# Patient Record
Sex: MALE | Race: WHITE | Employment: FULL TIME | URBAN - METROPOLITAN AREA
[De-identification: names, ages, dates, MRNs, and addresses within clinical notes are randomized per-mention and may not be internally consistent; named-entity substitution may affect disease eponyms.]

---

## 2017-09-14 ENCOUNTER — TELEPHONE (OUTPATIENT)
Dept: SLEEP MEDICINE | Age: 52
End: 2017-09-14

## 2017-09-14 NOTE — TELEPHONE ENCOUNTER
Patient called requesting to speak with Kleber Pablo to schedule his sleep study that was ordered in Dec 2016. Was not sure if referral  or if we needed a new pre study checklist? Roger contact patient with next steps.

## 2017-09-26 ENCOUNTER — TELEPHONE (OUTPATIENT)
Dept: SLEEP MEDICINE | Age: 52
End: 2017-09-26

## 2017-09-26 NOTE — TELEPHONE ENCOUNTER
Called patient to complete pre study checklist but patient was busy so he stated he would call our office back to complete checklist and schedule for sleep study.

## 2017-09-27 ENCOUNTER — TELEPHONE (OUTPATIENT)
Dept: SLEEP MEDICINE | Age: 52
End: 2017-09-27

## 2017-09-27 DIAGNOSIS — G47.33 OBSTRUCTIVE SLEEP APNEA (ADULT) (PEDIATRIC): Primary | ICD-10-CM

## 2017-09-27 NOTE — TELEPHONE ENCOUNTER
STUDY ORDER CONFIRMATION  Fran Toro 1965      Type of Study Requested: Direct Referral for Study - Please arrange a sleep study consult only if sleep study is positive. Referring Physician: Dr. Anthony Nassar    Date of Study: 11/19/17  Spoke with: Patient         Height: 5'9  Weight: 250lbs  (over 499 lb can only be done at [facility])  BMI:       Snoring      yes    Excessive Daytime Sleepiness   yes    Witnessed Apnea     yes    Hypertension      no    Cardiovascular disease (CHF-Heart Failure)  no    COPD or Emphysema?    no  On Oxygen? lpm Day/Night   no    Restless Leg Symptoms-   Do you experience an uncomfortable sensation in your legs   especially at night?    no   Kicking?     no   Twitching?     no    Do you experience insomnia?   no  Sleep talking/walking?     no  Do you ever wake with a rapid heart rate?  no  Unusual movements in sleep (violent, flailing limbs? )no  Night Terrors?      no  Sleep Paralysis or Sleep Hallucinations:  (while waking from sleep or dream, you cannot move) no  Do you/have you had seizures?   no  Have you had a Stroke, CVA or TIA?   no       When? Do you take any medications for the following? Pain       no  Anxiety       no  Sleep       no               Have you been in hospital?    no   Has it been at least 72 hrs. since discharge? no   Discharge Date   (If not at least 72 hrs, cannot see pt. for study)    Are you currently on an antibiotic?   no  If yes, for what reason? Do you need anything from us for your employer? no    Are you a CDL, DOT or other Certified ? no     Have you had a sleep study before?   no  If yes, when and where? Are you currently using CPAP?   no  If yes, what is the setting? Do you have any special needs?   Wheelchair      no  Help to and from the bathroom   no  Other?      no    (If yes to any special needs a care giver may need to come with the patient and stay the night.)    Will someone need to accompany you on the night of your study? (Primarily for parents of minors, patients with special needs, elderly, long distance travel). Other family,  may stay until study begins. \"We want to be sure to take the best care of you. Are there Cultural or Spiritual needs that we should be aware of or are there any concerns that I can address for you?        no    If yes, describe:      Attach Medication List    If yes to any \"*\" or special needs, discuss with the Lead Tech    Notes:     Study date:11/19/17  Time:8:30pm Location:[facility]      Compiled by:  Linda Calvo    Date: 9/27/17

## 2017-09-29 NOTE — TELEPHONE ENCOUNTER
STUDY ORDER CONFIRMATION    Study Indication:   G47.33  Study: Diagnostic polysomnogram - CPT 52159: Split Study if patient meets the criteria.   Study Instructions / additional orders:

## 2017-11-19 ENCOUNTER — HOSPITAL ENCOUNTER (OUTPATIENT)
Dept: SLEEP MEDICINE | Age: 52
Discharge: HOME | End: 2017-11-19
Payer: COMMERCIAL

## 2017-11-19 VITALS
WEIGHT: 260.9 LBS | TEMPERATURE: 97.5 F | HEIGHT: 69 IN | DIASTOLIC BLOOD PRESSURE: 73 MMHG | OXYGEN SATURATION: 97 % | SYSTOLIC BLOOD PRESSURE: 132 MMHG | BODY MASS INDEX: 38.64 KG/M2

## 2017-11-19 DIAGNOSIS — G47.33 OBSTRUCTIVE SLEEP APNEA: ICD-10-CM

## 2017-11-19 PROCEDURE — 95811 POLYSOM 6/>YRS CPAP 4/> PARM: CPT

## 2017-11-21 ENCOUNTER — TELEPHONE (OUTPATIENT)
Dept: SLEEP MEDICINE | Age: 52
End: 2017-11-21

## 2017-11-22 ENCOUNTER — TELEPHONE (OUTPATIENT)
Dept: SLEEP MEDICINE | Age: 52
End: 2017-11-22

## 2017-12-21 ENCOUNTER — OFFICE VISIT (OUTPATIENT)
Dept: SLEEP MEDICINE | Age: 52
End: 2017-12-21

## 2017-12-21 VITALS
HEART RATE: 90 BPM | SYSTOLIC BLOOD PRESSURE: 145 MMHG | OXYGEN SATURATION: 98 % | BODY MASS INDEX: 38.51 KG/M2 | HEIGHT: 69 IN | DIASTOLIC BLOOD PRESSURE: 88 MMHG | WEIGHT: 260 LBS | TEMPERATURE: 98.2 F

## 2017-12-21 DIAGNOSIS — E66.01 SEVERE OBESITY (BMI 35.0-39.9): ICD-10-CM

## 2017-12-21 DIAGNOSIS — G47.33 OBSTRUCTIVE SLEEP APNEA SYNDROME: Primary | ICD-10-CM

## 2017-12-21 RX ORDER — EZETIMIBE AND SIMVASTATIN 10; 40 MG/1; MG/1
TABLET ORAL
Refills: 5 | COMMUNITY
Start: 2017-11-27

## 2017-12-21 RX ORDER — ESOMEPRAZOLE MAGNESIUM 40 MG/1
CAPSULE, DELAYED RELEASE ORAL
COMMUNITY
Start: 2017-12-18

## 2017-12-21 RX ORDER — FOLIC ACID-PYRIDOXINE-CYANOCOBALAMIN TAB 2.5-25-2 MG 2.5-25-2 MG
TAB ORAL
Refills: 5 | COMMUNITY
Start: 2017-12-12

## 2017-12-21 NOTE — PATIENT INSTRUCTIONS
[address] [place] [place], [address]  Tel.  [phone]  Fax. [address] [facility] [address]  [place], [address] [facility]  Tel.  [phone]  Fax. [phone] [address]  Tel.  [phone]  Fax. [phone]     Learning About CPAP for Sleep Apnea  What is CPAP? CPAP is a small machine that you use at home every night while you sleep. It increases air pressure in your throat to keep your airway open. When you have sleep apnea, this can help you sleep better so you feel much better. CPAP stands for \"continuous positive airway pressure. \"  The CPAP machine will have one of the following:  · A mask that covers your nose and mouth  · Prongs that fit into your nose  · A mask that covers your nose only, the most common type. This type is called NCPAP. The N stands for \"nasal.\"  Why is it done? CPAP is usually the best treatment for obstructive sleep apnea. It is the first treatment choice and the most widely used. Your doctor may suggest CPAP if you have:  · Moderate to severe sleep apnea. · Sleep apnea and coronary artery disease (CAD) or heart failure. How does it help? · CPAP can help you have more normal sleep, so you feel less sleepy and more alert during the daytime. · CPAP may help keep heart failure or other heart problems from getting worse. · NCPAP may help lower your blood pressure. · If you use CPAP, your bed partner may also sleep better because you are not snoring or restless. What are the side effects? Some people who use CPAP have:  · A dry or stuffy nose and a sore throat. · Irritated skin on the face. · Sore eyes. · Bloating. If you have any of these problems, work with your doctor to fix them. Here are some things you can try:  · Be sure the mask or nasal prongs fit well. · See if your doctor can adjust the pressure of your CPAP. · If your nose is dry, try a humidifier.   · If your nose is runny or stuffy, try decongestant medicine or a steroid nasal spray. If these things do not help, you might try a different type of machine. Some machines have air pressure that adjusts on its own. Others have air pressures that are different when you breathe in than when you breathe out. This may reduce discomfort caused by too much pressure in your nose. Where can you learn more? Go to [employer] ([email or web address] mi).be  Enter Luis Penn in the search box to learn more about \"Learning About CPAP for Sleep Apnea. \"   © 9361-0786 Healthwise, Incorporated. Care instructions adapted under license by [facility] (which disclaims liability or warranty for this information). This care instruction is for use with your licensed healthcare professional. If you have questions about a medical condition or this instruction, always ask your healthcare professional. [address] any warranty or liability for your use of this information. Content Version: 6.1.76173; Last Revised: January 11, 2010  PROPER SLEEP HYGIENE    What to avoid  · Do not have drinks with caffeine, such as coffee or black tea, for 8 hours before bed. · Do not smoke or use other types of tobacco near bedtime. Nicotine is a stimulant and can keep you awake. · Avoid drinking alcohol late in the evening, because it can cause you to wake in the middle of the night. · Do not eat a big meal close to bedtime. If you are hungry, eat a light snack. · Do not drink a lot of water close to bedtime, because the need to urinate may wake you up during the night. · Do not read or watch TV in bed. Use the bed only for sleeping and sexual activity. What to try  · Go to bed at the same time every night, and wake up at the same time every morning. Do not take naps during the day. · Keep your bedroom quiet, dark, and cool. · Get regular exercise, but not within 3 to 4 hours of your bedtime. .  · Sleep on a comfortable pillow and mattress.   · If watching the clock makes you anxious, turn it facing away from you so you cannot see the time. · If you worry when you lie down, start a worry book. Well before bedtime, write down your worries, and then set the book and your concerns aside. · Try meditation or other relaxation techniques before you go to bed. · If you cannot fall asleep, get up and go to another room until you feel sleepy. Do something relaxing. Repeat your bedtime routine before you go to bed again. · Make your house quiet and calm about an hour before bedtime. Turn down the lights, turn off the TV, log off the computer, and turn down the volume on music. This can help you relax after a busy day. Drowsy Driving: The [employer] cites drowsiness as a causing factor in more than 780,953 police reported crashes annually, resulting in 76,000 injuries and 1,500 deaths. Other surveys suggest 55% of people polled have driven while drowsy in the past year, 23% had fallen asleep but not crashed, 3% crashed, and 2% had and accident due to drowsy driving. Who is at risk? Young Drivers: One study of drowsy driving accidents states that 55% of the drivers were under 25 years. Of those, 75% were male. Shift Workers and Travelers: People who work overnight or travel across time zones frequently are at higher risk of experiencing Circadian Rhythm Disorders. They are trying to work and function when their body is programed to sleep. Sleep Deprived: Lack of sleep has a serious impact on your ability to pay attention or focus on a task. Consistently getting less than the average of 8 hours your body needs creates partial or cumulative sleep deprivation. Untreated Sleep Disorders: Sleep Apnea, Narcolepsy, R.L.S., and other sleep disorders (untreated) prevent a person from getting enough restful sleep. This leads to excessive daytime sleepiness and increases the risk for drowsy driving accidents by up to 7 times.   Medications / Alcohol: Even over the counter medications can cause drowsiness. Medications that impair a drivers attention should have a warning label. Alcohol naturally makes you sleepy and on its own can cause accidents. Combined with excessive drowsiness its effects are amplified. Signs of Drowsy Driving:   * You don't remember driving the last few miles   * You may drift out of your russell   * You are unable to focus and your thoughts wander   * You may yawn more often than normal   * You have difficulty keeping your eyes open / nodding off   * Missing traffic signs, speeding, or tailgating  Prevention-   Good sleep hygiene, lifestyle and behavioral choices have the most impact on drowsy driving. There is no substitute for sleep and the average person requires 8 hours nightly. If you find yourself driving drowsy, stop and sleep. Consider the sleep hygiene tips provided during your visit as well. Medication Refill Policy: Refills for all medications require 1 week advance notice. Please have your pharmacy fax a refill request. We are unable to fax, or call in \"controled substance\" medications and you will need to pick these prescriptions up from our office. [employer] Activation    Thank you for requesting access to [employer]. Please follow the instructions below to securely access and download your online medical record. [employer] allows you to send messages to your doctor, view your test results, renew your prescriptions, schedule appointments, and more. How Do I Sign Up? 1. In your internet browser, go to [email or web address]. 2. Click on the First Time User? Click Here link in the Sign In box. You will see the New Member Sign Up page. 3. Enter your [employer] Access Code exactly as it appears below. You will not need to use this code after youve completed the sign-up process. If you do not sign up before the expiration date, you must request a new code.     [employer] Access Code: [email or web address]  Expires: 2/15/2018 11:02 AM (This is the date your [facility]yo access code will )    4. Enter the last four digits of your Social Security Number (xxxx) and Date of Birth (mm/dd/yyyy) as indicated and click Submit. You will be taken to the next sign-up page. 5. Create a Heyo ID. This will be your [facility] login ID and cannot be changed, so think of one that is secure and easy to remember. 6. Create a Heyo password. You can change your password at any time. 7. Enter your Password Reset Question and Answer. This can be used at a later time if you forget your password. 8. Enter your e-mail address. You will receive e-mail notification when new information is available in [address] 9. Click Sign Up. You can now view and download portions of your medical record. 10. Click the Download Summary menu link to download a portable copy of your medical information. Additional Information    If you have questions, please call [phone]. Remember, [employer] is NOT to be used for urgent needs. For medical emergencies, dial 911.

## 2017-12-21 NOTE — PROGRESS NOTES
[address] [facility] [place]. [place], [address]  Tel.  [phone]  Fax. [address] [facility] [address]  [place], [address] [facility]  Tel.  [phone]  Fax. [phone] [address]  Tel.  [phone]  Fax. [phone]         Subjective: Radha Flynn is an 45 y.o. male referred for evaluation for a sleep disorder. He complains of snoring, snorting, periods of not breathing associated with excessive daytime sleepiness. Symptoms began several years ago, gradually worsening since that time. He usually can fall asleep in 2 minutes. Family or house members note snoring, periods of not breathing. He denies falling asleep while at work. Radha Flynn does wake up frequently at night. He is bothered by waking up too early and left unable to get back to sleep. He actually sleeps about 6 hours at night and wakes up about 6 times during the night. He does not work shifts: [employer] indicates he does get too little sleep at night. His bedtime is 1030. He awakens at 0500. He does take naps. He takes 4 naps a week lasting 15 to 30, Minute(s). He has the following observed behaviors: Loud snoring, Light snoring;  . Other remarks: waking with a gasp or snort   He had a sleep study in 2003, but never treated the sleep apnea  Polysomnogram was done and the results of the study were explained to the patient. During the first half of the night,  Apnea/Hypopnea index was 75 which reflects severe apnea. He met criteria for a split night and most of the respiratory events were controled at CPAP 14 cmH2O. Perceived sleep quality was improved after the sleep study. Conway Sleepiness Score: 15   which reflect moderate daytime drowsiness.     No Known Allergies      Current Outpatient Prescriptions:     FOLBIC 2.5-25-2 mg tablet, TAKE 1 TABLET BY MOUTH EVERY DAY, Disp: , Rfl: 5    esomeprazole (NEXIUM) 40 mg capsule, , Disp: , Rfl:     ezetimibe-simvastatin (VYTORIN) 10-40 mg per tablet, TAKE 1 TABLET BY MOUTH EVERY DAY, Disp: , Rfl: 5     He  has no past medical history on file. He  has a past surgical history that includes hx orthopaedic. He family history includes Hypertension in his mother. He  reports that he has never smoked. He has never used smokeless tobacco. He reports that he drinks alcohol. Review of Systems:  Constitutional:  No significant weight loss or weight gain. Eyes:  No blurred vision. CVS:  No significant chest pain  Pulm:  No significant shortness of breath  GI:  No significant nausea or vomiting  :  No significant nocturia  Musculoskeletal:  No significant joint pain at night  Skin:  No significant rashes  Neuro:  No significant dizziness   Psych:  No active mood issues    Sleep Review of Systems: notable for no difficulty falling asleep; +frequent awakenings at night; absent dreaming noted; no nightmares ; no early morning headaches; no memory problems; no concentration issues; no history of any automobile or occupational accidents due to daytime drowsiness. Objective:     Visit Vitals    /88    Pulse 90    Temp 98.2 °F (36.8 °C) (Oral)    Ht 5' 9\" (1.753 m)    Wt 260 lb (117.9 kg)    SpO2 98%    BMI 38.4 kg/m2         General:   Not in acute distress   Eyes:  Anicteric sclerae, no obvious strabismus   Nose:  No obvious nasal septum deviation    Oropharynx:   Class 3 oropharyngeal outlet, thick tongue base, enlarged and boggy uvula, low-lying soft palate, narrow tonsilo-pharyngeal pilars   Tonsils:   tonsils are absent   Neck:   Neck circ. in \"inches\": 16; midline trachea   Chest/Lungs:  Equal lung expansion, clear on auscultation    CVS:  Normal rate, regular rhythm; no JVD   Skin:  Warm to touch; no obvious rashes   Neuro:  No focal deficits ; no obvious tremor    Psych:  Normal affect,  normal countenance;          Assessment:       ICD-10-CM ICD-9-CM    1.  Obstructive sleep apnea syndrome G47.33 327.23 AMB SUPPLY ORDER   2. Severe obesity (BMI 35.0-39.9) ([facility]) E66.01 278.01          Plan:   Treatment options for sleep apnea were reviewed. * Will order APAP and followup in 6 weeks for first adherence visit for further adjustments and to gauge treatment response. * He was provided information on sleep apnea including coresponding risk factors and the importance of proper treatment. * Counseling was provided regarding proper sleep hygiene and safe driving. *  2. Obesity - I have counseled the patient regarding the benefits of weight loss. Thank you for allowing us to participate in your patient's medical care. We'll keep you updated on these investigations.   Roman Morrow MD  Diplomate in Sleep Medicine  [facility]

## 2017-12-26 ENCOUNTER — DOCUMENTATION ONLY (OUTPATIENT)
Dept: SLEEP MEDICINE | Age: 52
End: 2017-12-26

## 2017-12-26 NOTE — PROGRESS NOTES
PAP order was faxed to [facility] on 12/26/17

## 2018-05-13 ENCOUNTER — APPOINTMENT (OUTPATIENT)
Dept: GENERAL RADIOLOGY | Age: 53
DRG: 390 | End: 2018-05-13
Attending: EMERGENCY MEDICINE
Payer: COMMERCIAL

## 2018-05-13 ENCOUNTER — HOSPITAL ENCOUNTER (INPATIENT)
Age: 53
LOS: 3 days | Discharge: HOME | DRG: 390 | End: 2018-05-16
Attending: EMERGENCY MEDICINE | Admitting: FAMILY MEDICINE
Payer: COMMERCIAL

## 2018-05-13 ENCOUNTER — APPOINTMENT (OUTPATIENT)
Dept: CT IMAGING | Age: 53
DRG: 390 | End: 2018-05-13
Attending: EMERGENCY MEDICINE
Payer: COMMERCIAL

## 2018-05-13 DIAGNOSIS — K56.609 SBO (SMALL BOWEL OBSTRUCTION) (HCC): Primary | ICD-10-CM

## 2018-05-13 DIAGNOSIS — N30.00 ACUTE CYSTITIS WITHOUT HEMATURIA: ICD-10-CM

## 2018-05-13 LAB
ALBUMIN SERPL-MCNC: 3.3 G/DL (ref 3.5–5)
ALBUMIN/GLOB SERPL: 0.6 {RATIO} (ref 1.1–2.2)
ALP SERPL-CCNC: 66 U/L (ref 45–117)
ALT SERPL-CCNC: 28 U/L (ref 12–78)
ANION GAP SERPL CALC-SCNC: 9 MMOL/L (ref 5–15)
APPEARANCE UR: ABNORMAL
AST SERPL-CCNC: 10 U/L (ref 15–37)
BACTERIA URNS QL MICRO: ABNORMAL /HPF
BASOPHILS # BLD: 0 K/UL (ref 0–0.1)
BASOPHILS NFR BLD: 0 % (ref 0–1)
BILIRUB SERPL-MCNC: 1.5 MG/DL (ref 0.2–1)
BILIRUB UR QL CFM: POSITIVE
BUN SERPL-MCNC: 15 MG/DL (ref 6–20)
BUN/CREAT SERPL: 13 (ref 12–20)
CALCIUM SERPL-MCNC: 10.1 MG/DL (ref 8.5–10.1)
CHLORIDE SERPL-SCNC: 100 MMOL/L (ref 97–108)
CO2 SERPL-SCNC: 26 MMOL/L (ref 21–32)
COLOR UR: ABNORMAL
CREAT SERPL-MCNC: 1.12 MG/DL (ref 0.7–1.3)
DIFFERENTIAL METHOD BLD: ABNORMAL
EOSINOPHIL # BLD: 0 K/UL (ref 0–0.4)
EOSINOPHIL NFR BLD: 0 % (ref 0–7)
EPITH CASTS URNS QL MICRO: ABNORMAL /LPF
ERYTHROCYTE [DISTWIDTH] IN BLOOD BY AUTOMATED COUNT: 14.2 % (ref 11.5–14.5)
GLOBULIN SER CALC-MCNC: 5.1 G/DL (ref 2–4)
GLUCOSE SERPL-MCNC: 146 MG/DL (ref 65–100)
GLUCOSE UR STRIP.AUTO-MCNC: NEGATIVE MG/DL
HCT VFR BLD AUTO: 47.2 % (ref 36.6–50.3)
HGB BLD-MCNC: 15.9 G/DL (ref 12.1–17)
HGB UR QL STRIP: ABNORMAL
IMM GRANULOCYTES # BLD: 0 K/UL (ref 0–0.04)
IMM GRANULOCYTES NFR BLD AUTO: 0 % (ref 0–0.5)
KETONES UR QL STRIP.AUTO: 15 MG/DL
LEUKOCYTE ESTERASE UR QL STRIP.AUTO: ABNORMAL
LIPASE SERPL-CCNC: 83 U/L (ref 73–393)
LYMPHOCYTES # BLD: 0.4 K/UL (ref 0.8–3.5)
LYMPHOCYTES NFR BLD: 4 % (ref 12–49)
MCH RBC QN AUTO: 28.8 PG (ref 26–34)
MCHC RBC AUTO-ENTMCNC: 33.7 G/DL (ref 30–36.5)
MCV RBC AUTO: 85.4 FL (ref 80–99)
MONOCYTES # BLD: 0.6 K/UL (ref 0–1)
MONOCYTES NFR BLD: 6 % (ref 5–13)
MUCOUS THREADS URNS QL MICRO: ABNORMAL /LPF
NEUTS SEG # BLD: 9.8 K/UL (ref 1.8–8)
NEUTS SEG NFR BLD: 90 % (ref 32–75)
NITRITE UR QL STRIP.AUTO: POSITIVE
NRBC # BLD: 0 K/UL (ref 0–0.01)
NRBC BLD-RTO: 0 PER 100 WBC
PH UR STRIP: 6 [PH] (ref 5–8)
PLATELET # BLD AUTO: 344 K/UL (ref 150–400)
PMV BLD AUTO: 9.9 FL (ref 8.9–12.9)
POTASSIUM SERPL-SCNC: 3.6 MMOL/L (ref 3.5–5.1)
PROT SERPL-MCNC: 8.4 G/DL (ref 6.4–8.2)
PROT UR STRIP-MCNC: 300 MG/DL
RBC # BLD AUTO: 5.53 M/UL (ref 4.1–5.7)
RBC #/AREA URNS HPF: ABNORMAL /HPF (ref 0–5)
RBC MORPH BLD: ABNORMAL
SODIUM SERPL-SCNC: 135 MMOL/L (ref 136–145)
SP GR UR REFRACTOMETRY: >1.03 (ref 1–1.03)
UA: UC IF INDICATED,UAUC: ABNORMAL
UROBILINOGEN UR QL STRIP.AUTO: 1 EU/DL (ref 0.2–1)
WBC # BLD AUTO: 10.8 K/UL (ref 4.1–11.1)
WBC URNS QL MICRO: ABNORMAL /HPF (ref 0–4)

## 2018-05-13 PROCEDURE — 36415 COLL VENOUS BLD VENIPUNCTURE: CPT | Performed by: EMERGENCY MEDICINE

## 2018-05-13 PROCEDURE — 96366 THER/PROPH/DIAG IV INF ADDON: CPT

## 2018-05-13 PROCEDURE — 74011636320 HC RX REV CODE- 636/320: Performed by: EMERGENCY MEDICINE

## 2018-05-13 PROCEDURE — 87086 URINE CULTURE/COLONY COUNT: CPT | Performed by: EMERGENCY MEDICINE

## 2018-05-13 PROCEDURE — 80053 COMPREHEN METABOLIC PANEL: CPT | Performed by: EMERGENCY MEDICINE

## 2018-05-13 PROCEDURE — 96361 HYDRATE IV INFUSION ADD-ON: CPT

## 2018-05-13 PROCEDURE — 74011250636 HC RX REV CODE- 250/636: Performed by: EMERGENCY MEDICINE

## 2018-05-13 PROCEDURE — 85025 COMPLETE CBC W/AUTO DIFF WBC: CPT | Performed by: EMERGENCY MEDICINE

## 2018-05-13 PROCEDURE — 74011250636 HC RX REV CODE- 250/636: Performed by: FAMILY MEDICINE

## 2018-05-13 PROCEDURE — 96365 THER/PROPH/DIAG IV INF INIT: CPT

## 2018-05-13 PROCEDURE — 96376 TX/PRO/DX INJ SAME DRUG ADON: CPT

## 2018-05-13 PROCEDURE — 96375 TX/PRO/DX INJ NEW DRUG ADDON: CPT

## 2018-05-13 PROCEDURE — 99285 EMERGENCY DEPT VISIT HI MDM: CPT

## 2018-05-13 PROCEDURE — 83690 ASSAY OF LIPASE: CPT | Performed by: EMERGENCY MEDICINE

## 2018-05-13 PROCEDURE — 74177 CT ABD & PELVIS W/CONTRAST: CPT

## 2018-05-13 PROCEDURE — 77030008768 HC TU NG VYGC -A

## 2018-05-13 PROCEDURE — 74011000258 HC RX REV CODE- 258: Performed by: EMERGENCY MEDICINE

## 2018-05-13 PROCEDURE — 74018 RADEX ABDOMEN 1 VIEW: CPT

## 2018-05-13 PROCEDURE — 93005 ELECTROCARDIOGRAM TRACING: CPT

## 2018-05-13 PROCEDURE — 74022 RADEX COMPL AQT ABD SERIES: CPT

## 2018-05-13 PROCEDURE — 74011000250 HC RX REV CODE- 250: Performed by: EMERGENCY MEDICINE

## 2018-05-13 PROCEDURE — 81001 URINALYSIS AUTO W/SCOPE: CPT | Performed by: EMERGENCY MEDICINE

## 2018-05-13 PROCEDURE — 74011250636 HC RX REV CODE- 250/636

## 2018-05-13 PROCEDURE — 65270000029 HC RM PRIVATE

## 2018-05-13 RX ORDER — MORPHINE SULFATE 4 MG/ML
INJECTION INTRAVENOUS
Status: DISCONTINUED
Start: 2018-05-13 | End: 2018-05-13

## 2018-05-13 RX ORDER — ONDANSETRON 2 MG/ML
4 INJECTION INTRAMUSCULAR; INTRAVENOUS
Status: COMPLETED | OUTPATIENT
Start: 2018-05-13 | End: 2018-05-13

## 2018-05-13 RX ORDER — FAMOTIDINE 20 MG/50ML
20 INJECTION, SOLUTION INTRAVENOUS EVERY 12 HOURS
Status: DISCONTINUED | OUTPATIENT
Start: 2018-05-13 | End: 2018-05-16 | Stop reason: HOSPADM

## 2018-05-13 RX ORDER — MORPHINE SULFATE 10 MG/ML
2 INJECTION, SOLUTION INTRAMUSCULAR; INTRAVENOUS
Status: DISCONTINUED | OUTPATIENT
Start: 2018-05-13 | End: 2018-05-16 | Stop reason: HOSPADM

## 2018-05-13 RX ORDER — SODIUM CHLORIDE 0.9 % (FLUSH) 0.9 %
10 SYRINGE (ML) INJECTION
Status: COMPLETED | OUTPATIENT
Start: 2018-05-13 | End: 2018-05-13

## 2018-05-13 RX ORDER — LORAZEPAM 2 MG/ML
1 INJECTION INTRAMUSCULAR
Status: DISCONTINUED | OUTPATIENT
Start: 2018-05-13 | End: 2018-05-16 | Stop reason: HOSPADM

## 2018-05-13 RX ORDER — ONDANSETRON 2 MG/ML
INJECTION INTRAMUSCULAR; INTRAVENOUS
Status: COMPLETED
Start: 2018-05-13 | End: 2018-05-13

## 2018-05-13 RX ORDER — MORPHINE SULFATE 10 MG/ML
4 INJECTION, SOLUTION INTRAMUSCULAR; INTRAVENOUS
Status: DISCONTINUED | OUTPATIENT
Start: 2018-05-13 | End: 2018-05-13 | Stop reason: ALTCHOICE

## 2018-05-13 RX ORDER — SODIUM CHLORIDE 9 MG/ML
50 INJECTION, SOLUTION INTRAVENOUS
Status: COMPLETED | OUTPATIENT
Start: 2018-05-13 | End: 2018-05-13

## 2018-05-13 RX ORDER — SODIUM CHLORIDE 0.9 % (FLUSH) 0.9 %
5-10 SYRINGE (ML) INJECTION AS NEEDED
Status: DISCONTINUED | OUTPATIENT
Start: 2018-05-13 | End: 2018-05-16 | Stop reason: HOSPADM

## 2018-05-13 RX ORDER — ONDANSETRON 2 MG/ML
4 INJECTION INTRAMUSCULAR; INTRAVENOUS
Status: DISCONTINUED | OUTPATIENT
Start: 2018-05-13 | End: 2018-05-16 | Stop reason: HOSPADM

## 2018-05-13 RX ORDER — DEXTROSE, SODIUM CHLORIDE, AND POTASSIUM CHLORIDE 5; .9; .15 G/100ML; G/100ML; G/100ML
125 INJECTION INTRAVENOUS CONTINUOUS
Status: DISCONTINUED | OUTPATIENT
Start: 2018-05-13 | End: 2018-05-16

## 2018-05-13 RX ORDER — MORPHINE SULFATE 4 MG/ML
4 INJECTION INTRAVENOUS
Status: DISCONTINUED | OUTPATIENT
Start: 2018-05-13 | End: 2018-05-13 | Stop reason: ALTCHOICE

## 2018-05-13 RX ORDER — SODIUM CHLORIDE 0.9 % (FLUSH) 0.9 %
5-10 SYRINGE (ML) INJECTION EVERY 8 HOURS
Status: DISCONTINUED | OUTPATIENT
Start: 2018-05-13 | End: 2018-05-13 | Stop reason: ALTCHOICE

## 2018-05-13 RX ORDER — MORPHINE SULFATE 10 MG/ML
4 INJECTION, SOLUTION INTRAMUSCULAR; INTRAVENOUS
Status: COMPLETED | OUTPATIENT
Start: 2018-05-13 | End: 2018-05-13

## 2018-05-13 RX ORDER — ONDANSETRON 2 MG/ML
INJECTION INTRAMUSCULAR; INTRAVENOUS
Status: DISCONTINUED
Start: 2018-05-13 | End: 2018-05-13

## 2018-05-13 RX ORDER — MORPHINE SULFATE 4 MG/ML
INJECTION INTRAVENOUS
Status: COMPLETED
Start: 2018-05-13 | End: 2018-05-13

## 2018-05-13 RX ADMIN — DIATRIZOATE MEGLUMINE AND DIATRIZOATE SODIUM 30 ML: 660; 100 LIQUID ORAL; RECTAL at 07:55

## 2018-05-13 RX ADMIN — SODIUM CHLORIDE 50 ML/HR: 900 INJECTION, SOLUTION INTRAVENOUS at 09:08

## 2018-05-13 RX ADMIN — Medication 10 ML: at 09:08

## 2018-05-13 RX ADMIN — MORPHINE SULFATE 4 MG: 4 INJECTION INTRAVENOUS at 06:47

## 2018-05-13 RX ADMIN — ONDANSETRON 4 MG: 2 INJECTION INTRAMUSCULAR; INTRAVENOUS at 06:46

## 2018-05-13 RX ADMIN — DEXTROSE MONOHYDRATE, SODIUM CHLORIDE, AND POTASSIUM CHLORIDE 125 ML/HR: 50; 9; 1.49 INJECTION, SOLUTION INTRAVENOUS at 20:18

## 2018-05-13 RX ADMIN — ONDANSETRON 4 MG: 2 INJECTION INTRAMUSCULAR; INTRAVENOUS at 08:10

## 2018-05-13 RX ADMIN — IOPAMIDOL 100 ML: 755 INJECTION, SOLUTION INTRAVENOUS at 09:08

## 2018-05-13 RX ADMIN — FAMOTIDINE 20 MG: 20 INJECTION, SOLUTION INTRAVENOUS at 12:00

## 2018-05-13 RX ADMIN — BENZOCAINE: 200 SPRAY DENTAL; ORAL; PERIODONTAL at 10:39

## 2018-05-13 RX ADMIN — DEXTROSE MONOHYDRATE, SODIUM CHLORIDE, AND POTASSIUM CHLORIDE 125 ML/HR: 50; 9; 1.49 INJECTION, SOLUTION INTRAVENOUS at 11:57

## 2018-05-13 RX ADMIN — ONDANSETRON 4 MG: 2 INJECTION INTRAMUSCULAR; INTRAVENOUS at 10:39

## 2018-05-13 RX ADMIN — MORPHINE SULFATE 4 MG: 10 INJECTION, SOLUTION INTRAMUSCULAR; INTRAVENOUS at 08:04

## 2018-05-13 RX ADMIN — SODIUM CHLORIDE 1000 ML: 900 INJECTION, SOLUTION INTRAVENOUS at 06:52

## 2018-05-13 RX ADMIN — SODIUM CHLORIDE 1000 ML: 900 INJECTION, SOLUTION INTRAVENOUS at 06:41

## 2018-05-13 RX ADMIN — FAMOTIDINE 20 MG: 20 INJECTION, SOLUTION INTRAVENOUS at 20:19

## 2018-05-13 RX ADMIN — CEFTRIAXONE 1 G: 1 INJECTION, POWDER, FOR SOLUTION INTRAMUSCULAR; INTRAVENOUS at 09:30

## 2018-05-13 NOTE — ED PROVIDER NOTES
EMERGENCY DEPARTMENT HISTORY AND PHYSICAL EXAM      Date: 5/13/2018  Patient Name: Leandro Agee    History of Presenting Illness     Chief Complaint   Patient presents with    Abdominal Pain     Ambulatory to traige w/ wife. Reported emergent appendectomy at [facility] on Friday. Last BM on Thursday. Last pain meds taken 2200 last night. Reported feeling bloated. Called gen surg oncall and was advised to come to ED and be seen for possible blockage.  Vomiting     \"green stuff\"     Nausea       History Provided By: Patient    HPI: Leandro Agee, 52 y.o. male with a PMHx significant for HLD, presents ambulatory to the ED with cc of worsening abdominal distention x 3 days. Pt endorses associated decreased flatus, abdominal pain, and decreased appetite. Pt explains that he had an emergency appendectomy performed by Dr. Brown Adams at [facility] 2 days ago. Pt notes that he was released from the hospital later on that evening with prescriptions for Percocet, Tylenol, and Senna for the management of his pain and possible constipation. Pt states that he had active bowel sounds before being discharged from the hospital, but he was unable to pass flatus after his procedure. Pt reports that he continued to have worsening abdominal distention after his procedure, and he also had an episode of N/V with green emesis. Pt notes that he has only been able to consume liquids, and his last intake of solid food was 2 days ago. Pt states that his last BM was 3 days ago, and he called the on-call physician this morning who instructed him to come into the ED to rule out a possible SBO. Pt makes note that he is currently on medication for HLD. Pt specifically denies fever or chills. There are no other complaints, changes, or physical findings at this time.     PCP: Baron Katey MD    Current Facility-Administered Medications   Medication Dose Route Frequency Provider Last Rate Last Dose    sodium chloride (NS) flush 5-10 mL  5-10 mL IntraVENous PRN Cora Steinberg MD        morphine injection 2 mg  2 mg IntraVENous Q2H PRN Cora Steinberg MD        ondansetron [facility]) injection 4 mg  4 mg IntraVENous Q4H PRN Cora Steinberg MD        LORazepam (ATIVAN) injection 1 mg  1 mg IntraVENous Q6H PRN Cora Steinberg MD        dextrose 5% - 0.9% NaCl with KCl 20 mEq/L infusion  125 mL/hr IntraVENous CONTINUOUS Cora Steinberg MD        famotidine (PF) (PEPCID) 20 mg in sodium chloride 0.9% 10 mL injection  20 mg IntraVENous Q12H Cora Steinberg MD        [START ON 5/14/2018] cefTRIAXone (ROCEPHIN) 1 g in 0.9% sodium chloride (MBP/ADV) 50 mL  1 g IntraVENous Q24H Cora Steinberg MD        phenol throat spray (CHLORASEPTIC) 1 Spray  1 Spray Oral PRN Cora Steinberg MD         Current Outpatient Prescriptions   Medication Sig Dispense Refill    FOLBIC 2.5-25-2 mg tablet TAKE 1 TABLET BY MOUTH EVERY DAY  5    esomeprazole (NEXIUM) 40 mg capsule       ezetimibe-simvastatin (VYTORIN) 10-40 mg per tablet TAKE 1 TABLET BY MOUTH EVERY DAY  5       Past History     Past Medical History:  History reviewed. No pertinent past medical history. Past Surgical History:  Past Surgical History:   Procedure Laterality Date    HX ORTHOPAEDIC      knee       Family History:  Family History   Problem Relation Age of Onset    Hypertension Mother        Social History:  Social History   Substance Use Topics    Smoking status: Never Smoker    Smokeless tobacco: Never Used    Alcohol use Yes       Allergies: Allergies   Allergen Reactions    Pcn [Penicillins] Rash         Review of Systems   Review of Systems   Constitutional: Positive for appetite change. Negative for activity change, chills and fever. HENT: Negative for congestion and sore throat. Eyes: Negative for pain and redness. Respiratory: Negative for cough, chest tightness and shortness of breath. Cardiovascular: Negative for chest pain and palpitations. Gastrointestinal: Positive for abdominal distention, abdominal pain, nausea and vomiting. Negative for diarrhea.        + decreased flatus   Genitourinary: Negative for dysuria, frequency and urgency. Musculoskeletal: Negative for back pain and neck pain. Skin: Negative for rash. Neurological: Negative for syncope, light-headedness and headaches. Psychiatric/Behavioral: Negative for confusion. All other systems reviewed and are negative. Physical Exam   Physical Exam   Constitutional: He is oriented to person, place, and time. He appears well-developed and well-nourished. No distress. HENT:   Head: Normocephalic. Nose: Nose normal.   Mouth/Throat: Oropharynx is clear and moist. No oropharyngeal exudate. Eyes: Conjunctivae are normal. Pupils are equal, round, and reactive to light. No scleral icterus. Neck: Normal range of motion. Neck supple. No JVD present. No tracheal deviation present. No thyromegaly present. Cardiovascular: Regular rhythm and intact distal pulses. Tachycardia present. Exam reveals no gallop and no friction rub. No murmur heard. Pulmonary/Chest: Effort normal and breath sounds normal. No stridor. No respiratory distress. He has no wheezes. He has no rales. Abdominal: Soft. He exhibits distension. There is no rebound and no guarding. Mild, diffuse tenderness  Hypoactive bowel sounds   Musculoskeletal: Normal range of motion. He exhibits no edema. Lymphadenopathy:     He has no cervical adenopathy. Neurological: He is alert and oriented to person, place, and time. No cranial nerve deficit. He exhibits normal muscle tone. Coordination normal.   Skin: Skin is warm and dry. No rash noted. He is not diaphoretic. No erythema. Psychiatric: He has a normal mood and affect. His behavior is normal.   Nursing note and vitals reviewed.         Diagnostic Study Results     Labs -     Recent Results (from the past 12 hour(s))   EKG, 12 LEAD, INITIAL    Collection Time: 05/13/18  6:30 AM   Result Value Ref Range    Ventricular Rate 128 BPM    Atrial Rate 128 BPM    P-R Interval 128 ms    QRS Duration 94 ms    Q-T Interval 296 ms    QTC Calculation (Bezet) 432 ms    Calculated P Axis 17 degrees    Calculated R Axis -56 degrees    Calculated T Axis 62 degrees    Diagnosis       Sinus tachycardia  Left anterior fascicular block  Possible Anteroseptal infarct , age undetermined  Abnormal ECG  No previous ECGs available     CBC WITH AUTOMATED DIFF    Collection Time: 05/13/18  6:44 AM   Result Value Ref Range    WBC 10.8 4.1 - 11.1 K/uL    RBC 5.53 4.10 - 5.70 M/uL    HGB 15.9 12.1 - 17.0 g/dL    HCT 47.2 36.6 - 50.3 %    MCV 85.4 80.0 - 99.0 FL    MCH 28.8 26.0 - 34.0 PG    MCHC 33.7 30.0 - 36.5 g/dL    RDW 14.2 11.5 - 14.5 %    PLATELET 546 982 - 635 K/uL    MPV 9.9 8.9 - 12.9 FL    NRBC 0.0 0  WBC    ABSOLUTE NRBC 0.00 0.00 - 0.01 K/uL    NEUTROPHILS 90 (H) 32 - 75 %    LYMPHOCYTES 4 (L) 12 - 49 %    MONOCYTES 6 5 - 13 %    EOSINOPHILS 0 0 - 7 %    BASOPHILS 0 0 - 1 %    IMMATURE GRANULOCYTES 0 0.0 - 0.5 %    ABS. NEUTROPHILS 9.8 (H) 1.8 - 8.0 K/UL    ABS. LYMPHOCYTES 0.4 (L) 0.8 - 3.5 K/UL    ABS. MONOCYTES 0.6 0.0 - 1.0 K/UL    ABS. EOSINOPHILS 0.0 0.0 - 0.4 K/UL    ABS. BASOPHILS 0.0 0.0 - 0.1 K/UL    ABS. IMM.  GRANS. 0.0 0.00 - 0.04 K/UL    DF AUTOMATED      RBC COMMENTS NORMOCYTIC, NORMOCHROMIC     METABOLIC PANEL, COMPREHENSIVE    Collection Time: 05/13/18  6:44 AM   Result Value Ref Range    Sodium 135 (L) 136 - 145 mmol/L    Potassium 3.6 3.5 - 5.1 mmol/L    Chloride 100 97 - 108 mmol/L    CO2 26 21 - 32 mmol/L    Anion gap 9 5 - 15 mmol/L    Glucose 146 (H) 65 - 100 mg/dL    BUN 15 6 - 20 MG/DL    Creatinine 1.12 0.70 - 1.30 MG/DL    BUN/Creatinine ratio 13 12 - 20      GFR est AA >60 >60 ml/min/1.73m2    GFR est non-AA >60 >60 ml/min/1.73m2    Calcium 10.1 8.5 - 10.1 MG/DL    Bilirubin, total 1.5 (H) 0.2 - 1.0 MG/DL    ALT (SGPT) 28 12 - 78 U/L    AST (SGOT) 10 (L) 15 - 37 U/L    Alk. phosphatase 66 45 - 117 U/L    Protein, total 8.4 (H) 6.4 - 8.2 g/dL    Albumin 3.3 (L) 3.5 - 5.0 g/dL    Globulin 5.1 (H) 2.0 - 4.0 g/dL    A-G Ratio 0.6 (L) 1.1 - 2.2     LIPASE    Collection Time: 05/13/18  6:44 AM   Result Value Ref Range    Lipase 83 73 - 393 U/L   URINALYSIS W/ REFLEX CULTURE    Collection Time: 05/13/18  8:00 AM   Result Value Ref Range    Color MARGO      Appearance CLOUDY (A) CLEAR      Specific gravity >1.030 (H) 1.003 - 1.030    pH (UA) 6.0 5.0 - 8.0      Protein 300 (A) NEG mg/dL    Glucose NEGATIVE  NEG mg/dL    Ketone 15 (A) NEG mg/dL    Blood SMALL (A) NEG      Urobilinogen 1.0 0.2 - 1.0 EU/dL    Nitrites POSITIVE (A) NEG      Leukocyte Esterase SMALL (A) NEG      WBC 0-4 0 - 4 /hpf    RBC 5-10 0 - 5 /hpf    Epithelial cells FEW FEW /lpf    Bacteria 1+ (A) NEG /hpf    UA:UC IF INDICATED URINE CULTURE ORDERED (A) CNI      Mucus 1+ (A) NEG /lpf   BILIRUBIN, CONFIRM    Collection Time: 05/13/18  8:00 AM   Result Value Ref Range    Bilirubin UA, confirm POSITIVE (A) NEG         Radiologic Studies -     CT Results  (Last 48 hours)               05/13/18 0909  CT ABD PELV W CONT Final result    Impression:  IMPRESSION:       1. Findings consistent with small bowel obstruction with transition point in the   ileum. 2. Small right lower quadrant mesenteric collection measuring 4 x 2.1 x 2.0 cm   in size. 3. Small amount of free intraperitoneal fluid. 4. Incidental small left inguinal hernia containing fat. Narrative:  EXAM:  CT ABD GAS W CONT       INDICATION: SBO high-grade suspect; lap appy on 5/11;       COMPARISON: Earlier radiographs       CONTRAST:  100 mL of Isovue-370. TECHNIQUE:    Following the uneventful intravenous administration of contrast, thin axial   images were obtained through the abdomen and pelvis. Coronal and sagittal   reconstructions were generated. Oral contrast was administered.  CT dose   reduction was achieved through use of a standardized protocol tailored for this   examination and automatic exposure control for dose modulation. FINDINGS:    LUNG BASES: Bibasilar linear opacities, greater on the left, consistent with   scarring or subsegmental atelectasis. No consolidation or pleural effusion. INCIDENTALLY IMAGED HEART AND MEDIASTINUM: Unremarkable. LIVER: Hepatic steatosis. GALLBLADDER: Layering fluid fluid level within nondistended gallbladder   consistent with gallbladder sludge or vicarious excretion of contrast material   within the gallbladder. SPLEEN: No mass. PANCREAS: No mass or ductal dilatation. ADRENALS: Unremarkable. KIDNEYS: No mass, calculus, or hydronephrosis. STOMACH: Mildly distended. SMALL BOWEL: Moderate distention, greater proximally with diameter measuring up   to 5.4 cm. A zone of transition in the mid to distal ileum is suggested. COLON: Retained contrast diffusely within colon, which is nondistended. APPENDIX: Absent. PERITONEUM: Small amount of free intraperitoneal fluid cavity within the pelvis. Small mesenteric collection in right lower quadrant measuring 4 cm craniocaudal   and 2.1 x 2.0 cm transverse. RETROPERITONEUM: No lymphadenopathy or aortic aneurysm. URINARY BLADDER: No mass or calculus. BONES: No destructive bone lesion. Spinal degenerative findings. ADDITIONAL COMMENTS: Small left inguinal hernia containing fat. CXR Results  (Last 48 hours)               05/13/18 0726  XR ABD ACUTE W 1 V CHEST Final result    Impression:  IMPRESSION:    Dilated loops of small bowel with air-fluid levels likely related to   obstruction. Retained oral contrast is seen in the colon which may indicate that   this is partial obstruction.  Recommend clinical correlation concerning the prior   contrast demonstration and the results of that exam.                               Narrative:  EXAM:  XR ABD ACUTE W 1 V CHEST       INDICATION:  Abdominal Pain COMPARISON: None. FINDINGS: The upright chest radiograph demonstrates thin areas of discoid   atelectasis in the mid left chest. The lungs are otherwise clear. No focal   consolidation, pleural effusion, or pneumothorax. There is no free air   underneath the diaphragm. Supine and upright views of the abdomen demonstrate retained oral contrast in   the colon. The colon is not dilated. There are multiple significantly dilated   loops of small bowel. Air fluid levels are present on the upright image. There   is no free intraperitoneal air. No soft tissue masses or pathologic   calcifications are identified. There is minimal curvature of the spine. .                 Medical Decision Making   I am the first provider for this patient. I reviewed the vital signs, available nursing notes, past medical history, past surgical history, family history and social history. Vital Signs-Reviewed the patient's vital signs. Patient Vitals for the past 12 hrs:   Temp Pulse Resp BP SpO2   05/13/18 0702 - (!) 103 23 - 93 %   05/13/18 0700 - (!) 104 24 135/84 -   05/13/18 0655 - 98 22 - 94 %   05/13/18 0652 - (!) 103 24 - 94 %   05/13/18 0648 - (!) 106 23 - 95 %   05/13/18 0645 - (!) 112 21 (!) 133/92 94 %   05/13/18 0641 - (!) 128 23 - 96 %   05/13/18 0636 - - - - 95 %   05/13/18 0635 - (!) 127 26 - 94 %   05/13/18 0632 - - - 131/89 -   05/13/18 0620 98.5 °F (36.9 °C) (!) 120 20 (!) 133/96 98 %       Pulse Oximetry Analysis - 98% on RA    Cardiac Monitor:   Rate: 120 bpm  Rhythm: Sinus Tachycardia      ED EKG interpretation: 06:30  Rhythm: sinus tachycardia; and regular . Rate (approx.): 128; Axis: normal; UT Interval: normal; QRS interval: LAFB; ST/T wave: non-specific changes; This EKG was interpreted by Luisito High MD,ED Provider.     Records Reviewed: Nursing Notes, Old Medical Records, Previous electrocardiograms, Previous Radiology Studies and Previous Laboratory Studies    Provider Notes (Medical Decision Making):   DDx: SBO, constipation    ED Course:   Initial assessment performed. The patients presenting problems have been discussed, and they are in agreement with the care plan formulated and outlined with them. I have encouraged them to ask questions as they arise throughout their visit. CONSULT NOTE:  10:20 AM  Primitivo Chi MD spoke with Tammy aGlaviz MD  Specialty: General Surgery  Discussed patient's hx, disposition, and available diagnostic and imaging results. Reviewed care plans. Consultant agrees with plans as outlined. Dr. Damon Gave recommends to consult [facility]'s general surgery unit. Written by LEXI Pugh, as dictated by Primitivo Chi MD.    PROGRESS NOTE:  10:26 AM  Contacted the [address] to consult with the General Surgeon. Written by LEXI Pugh, as dictated by Primitivo Chi MD.    PROGRESS NOTE:  10:31 AM  Pt and his family are refusing to be transferred to [facility]. Written by LEXI Pugh, as dictated by Primitivo Chi MD.    PROGRESS NOTE:  10:49 AM  Spoke with Dr. Delfina Wilson, a general surgeon at [facility], and reviewed ct findings of sbo; I made him aware that family wished to stay here at [facility]. He agrees that the pt can be evaluated by in house general surgery. Written by LEXI Pugh, as dictated by Primitivo Chi MD.    CONSULT NOTE:  10:50 AM  Primitivo Chi MD spoke with Tammy Galaviz MD  Specialty: General Surgery  Discussed patient's hx, disposition, and available diagnostic and imaging results. Reviewed care plans. Consultant agrees with plans as outlined. Dr. Damon Gave agrees to come and evaluate the pt. Written by LEXI Pugh, as dictated by Primitivo Chi MD.    10:50 AM  Will admit to general surgery for SBO and uti; tachycardia improved with iv fluids; sx improved dramatically after ng tube placement; Primitivo Chi MD      Critical Care Time:   0    Disposition:  PLAN:  1.  Admit to General Surgery    ADMIT NOTE:  10:50 AM  Patient is being admitted to the hospital by Dr. Kristina Guzmán. The results of their tests and reasons for their admission have been discussed with them and/or available family. They convey agreement and understanding for the need to be admitted and for their admission diagnosis. Consultation has been made with the inpatient physician specialist for hospitalization. Diagnosis     Clinical Impression:   1. SBO (small bowel obstruction) ([address]    2. Acute cystitis without hematuria        Attestations: This note is prepared by Cat Ruiz, acting as Scribe for Rosio Tillman MD.    Rosio Tillman MD: The scribe's documentation has been prepared under my direction and personally reviewed by me in its entirety. I confirm that the note above accurately reflects all work, treatment, procedures, and medical decision making performed by me.

## 2018-05-13 NOTE — IP AVS SNAPSHOT
[address] [facility] 
[phone] Patient: Ladan Matos MRN: [id number] :1965 A check rachna indicates which time of day the medication should be taken. My Medications CONTINUE taking these medications Instructions Each Dose to Equal  
 Morning Noon Evening Bedtime  
 esomeprazole 40 mg capsule Commonly known as:  Amite Crumble Notes to Patient:  Not taking in hospital  
   
      
   
   
   
  
 ezetimibe-simvastatin 10-40 mg per tablet Commonly known as:  VYTORIN Notes to Patient:  Not taking in hospital  
   
 TAKE 1 TABLET BY MOUTH EVERY DAY  
     
   
   
   
  
 FOLBIC 2.5-25-2 mg tablet Generic drug:  folic acid-vit L0-WPI G72 Notes to Patient:  Not taking in hospital  
   
 TAKE 1 TABLET BY MOUTH EVERY DAY ZyrTEC 10 mg Cap Generic drug:  Cetirizine Notes to Patient:  Not taking in hospital  
   
 Take 10 mg by mouth.   
 10 mg

## 2018-05-13 NOTE — ED NOTES
Bedside and Verbal shift change report given to Guille Matias RN (oncoming nurse) by Janes Robb RN (offgoing nurse). Report included the following information SBAR, ED Summary, MAR and Recent Results.

## 2018-05-13 NOTE — ED NOTES
NG tube inserted into pt's right nare without difficulty. Pt began vomiting immediately upon tube insertion. P tolerated well. Placement confirmed by auscultation and gastric content visualization. Pt reports feeling relief after vomiting and tube placement to suction. To continuous low suction at this time, per MD order.

## 2018-05-13 NOTE — H&P
Surgery History and Physical    Subjective: Azam Sosa is a 52 y.o. male who presents for evaluation of following recent appendectomy at [facility]. The patient developed acute appendicitis on Thursday AM.  Sandeep Locus to ER at [facility] and had Lap Appe on Friday morning and discharged Friday afternoon. At home developed abd distention, nausea and discomfort and eventual bilious vomiting. Came to ER for evaluation. CT scan showing \"SBO\". [facility] accepted patient in transfer but patient refuses return to [facility]    Urine suggest UTI      History reviewed. No pertinent past medical history. Past Surgical History:   Procedure Laterality Date    HX ORTHOPAEDIC      knee      Family History   Problem Relation Age of Onset    Hypertension Mother      Social History   Substance Use Topics    Smoking status: Never Smoker    Smokeless tobacco: Never Used    Alcohol use Yes      Prior to Admission medications    Medication Sig Start Date End Date Taking?  Authorizing Provider   FOLBIC 2.5-25-2 mg tablet TAKE 1 TABLET BY MOUTH EVERY DAY 12/12/17   Historical Provider   esomeprazole (NEXIUM) 40 mg capsule  12/18/17   Historical Provider   ezetimibe-simvastatin (VYTORIN) 10-40 mg per tablet TAKE 1 TABLET BY MOUTH EVERY DAY 11/27/17   Historical Provider      Allergies   Allergen Reactions    Pcn [Penicillins] Rash       Review of Systems see HPI/PMH    Objective:     Patient Vitals for the past 8 hrs:   BP Temp Pulse Resp SpO2 Height Weight   05/13/18 0702 - - (!) 103 23 93 % - -   05/13/18 0700 135/84 - (!) 104 24 - - -   05/13/18 0655 - - 98 22 94 % - -   05/13/18 0652 - - (!) 103 24 94 % - -   05/13/18 0648 - - (!) 106 23 95 % - -   05/13/18 0645 (!) 133/92 - (!) 112 21 94 % - -   05/13/18 0641 - - (!) 128 23 96 % - -   05/13/18 0636 - - - - 95 % - -   05/13/18 0635 - - (!) 127 26 94 % - -   05/13/18 0632 131/89 - - - - - -   05/13/18 0620 (!) 133/96 98.5 °F (36.9 °C) (!) 120 20 98 % 5' 9\" (1.753 m) 257 lb 15 oz (117 kg)       Temp (24hrs), Av.5 °F (36.9 °C), Min:98.5 °F (36.9 °C), Max:98.5 °F (36.9 °C)      Physical Exam Constitutional: He is oriented to person, place, and time. He appears well-developed and well-nourished. No acute distress. NG in place returning bilious fluid  Head: Normocephalic. Nose: Nose normal. NG  Mouth/Throat: Oropharynx is clear and moist.  Eyes: Conjunctivae are normal. Pupils are equal, round, and reactive . No scleral icterus. Neck: trach midline  Cardiovascular: Regular rate and rhythm   Pulmonary/Chest: Effort normal   Abdominal: Soft. distended There is no rebound and no guarding. Mild, diffuse tenderness, surgical sites dressed  Musculoskeletal:grossly wnl   Neurological: He is alert and oriented to person, place, and time. No cranial nerve deficit appreciated   Skin: Skin is warm and dry. Psychiatric: He has a normal mood and affect.  His behavior is normal.       Assessment:     SBO vs ileus    uti    Plan:     Admit  NPO/NG  IV hydration  IV pain med antiemetics  Serial labs, imaging and exams    Rocephin started for UTI    Dr Claudene Bon will assume management in AM    Signed By: Jaclyn Bella MD   [facility]    May 13, 2018

## 2018-05-13 NOTE — IP AVS SNAPSHOT
[address] [facility] 83. 
[phone] Patient: Deja Vuong MRN: [id number] :[id number] About your hospitalization You were admitted on:  May 13, 2018 You last received care in the:  [facility] 2 GENERAL SURGERY You were discharged on:  May 16, 2018 Why you were hospitalized Your primary diagnosis was:  Not on File Your diagnoses also included:  Sbo (Small Bowel Obstruction) (Hcc) Follow-up Information Follow up With Details Comments Contact Info Jones Kaiser MD Schedule an appointment as soon as possible for a visit in 1 week Call and schedule follow up appointment Bud  [address] [facility] 83. [phone] [facility] surgeon In 5 weeks keep this appt as planned before admission. Your Scheduled Appointments Thursday May 17, 2018 11:00 AM EDT Any with Reginald Owens MD  
[address] [place] ([facility]) [address] [facility], [address] [address] [phone] Discharge Orders None A check rachna indicates which time of day the medication should be taken. My Medications CONTINUE taking these medications Instructions Each Dose to Equal  
 Morning Noon Evening Bedtime  
 esomeprazole 40 mg capsule Commonly known as:  Osiel Gaxiola Notes to Patient:  Not taking in hospital  
   
      
   
   
   
  
 ezetimibe-simvastatin 10-40 mg per tablet Commonly known as:  VYTORIN Notes to Patient:  Not taking in hospital  
   
 TAKE 1 TABLET BY MOUTH EVERY DAY  
     
   
   
   
  
 FOLBIC 2.5-25-2 mg tablet Generic drug:  folic acid-vit N6-GUM J60 Notes to Patient:  Not taking in hospital  
   
 TAKE 1 TABLET BY MOUTH EVERY DAY ZyrTEC 10 mg Cap Generic drug:  Cetirizine Notes to Patient:  Not taking in hospital  
   
 Take 10 mg by mouth.   
 10 mg  
    
   
 Discharge Instructions Bowel Blockage (Intestinal Obstruction): Care Instructions Your Care Instructions A bowel blockage, also called an intestinal obstruction, can prevent gas, fluids, or solids from moving through the intestines normally. It can cause constipation and, rarely, diarrhea. You may have pain, nausea, vomiting, and cramping. Most of the time, complete blockages require a stay in the hospital and possibly surgery. But if your bowel is only partly blocked, your doctor may tell you to wait until it clears on its own and you are able to pass gas and stool. If so, there are things you can do at home to help make you feel better. If you have had surgery for a bowel blockage, there are things you can do at home to make sure you heal well. You can also make some changes to keep your bowel from becoming blocked again. Follow-up care is a key part of your treatment and safety. Be sure to make and go to all appointments, and call your doctor if you are having problems. It's also a good idea to know your test results and keep a list of the medicines you take. How can you care for yourself at home? If your doctor has told you to wait at home for a blockage to clear on its own: · Follow your doctor's instructions. These may include eating a liquid diet to avoid complete blockage. · Be safe with medicines. Take your medicines exactly as prescribed. Call your doctor if you think you are having a problem with your medicine. · Put a heating pad set on low on your belly to relieve mild cramps and pain. To prevent another blockage · Try to eat smaller amounts of food more often. For example, have 5 or 6 small meals throughout the day instead of 2 or 3 large meals. · Chew your food very well. Try to chew each bite about 20 times or until it is liquid.  
· Avoid high-fiber foods and raw fruits and vegetables with skins, husks, strings, or seeds. These can form a ball of undigested material that can cause a blockage if a part of your bowel is scarred or narrowed. · Check with your doctor before you eat whole-grain products or use a fiber supplement such as Citrucel or Metamucil. · To help you have regular bowel movements, eat at regular times, do not strain during a bowel movement, and drink at least 8 to 10 glasses of water each day. If you have kidney, heart, or liver disease and have to limit fluids, talk with your doctor or before you increase the amount of fluids you drink. · Drink high-calorie liquid formulas if your doctor says to. Severe symptoms may make it hard for your body to take in vitamins and minerals. · Get regular exercise. It helps you digest your food better. Get at least 30 minutes of physical activity on most days of the week. Walking is a good choice. When should you call for help? Call your doctor now or seek immediate medical care if: 
? · You have a fever. ? · You are vomiting. ? · You have new or worse belly pain. ? · You cannot pass stools or gas. ? Watch closely for changes in your health, and be sure to contact your doctor if you have any problems. Where can you learn more? Go to http://awa-mike.info/. Enter U646 in the search box to learn more about \"Bowel Blockage (Intestinal Obstruction): Care Instructions. \" Current as of: May 12, 2017 Content Version: 11.4 © 6420-5414 [employer]. Care instructions adapted under license by [employer] (which disclaims liability or warranty for this information). If you have questions about a medical condition or this instruction, always ask your healthcare professional. [place] [address] any warranty or liability for your use of this information. DISCHARGE SUMMARY from Nurse The following personal items are in your possession at time of discharge: 
 
Dental Appliances: None Visual Aid: None Home Medications: None Jewelry: None Clothing: None (left in in pt. room) Other Valuables: None PATIENT INSTRUCTIONS: 
 
 
 
To prevent infection remember to use good handwashing given. Your surgeons phone number is [phone]  Dr. Michelle Hodges. [facility]  Or  [facility] surgeon who performed the original surgery. What to do at Home: 
Recommended activity: Activity as tolerated. If you experience any of the following symptoms bloating, no bowel movement, Nausea or vomiting for longer than 4 hours or what brought you to the emergency department to begin with., please follow up with PCP or follow up with Dr. Michelle Hodges sooner than scheduled appointment. *  Please give a list of your current medications to your Primary Care Provider. *  Please update this list whenever your medications are discontinued, doses are 
    changed, or new medications (including over-the-counter products) are added. *  Please carry medication information at all times in case of emergency situations. These are general instructions for a healthy lifestyle: No smoking/ No tobacco products/ Avoid exposure to second hand smoke Surgeon General's Warning:  Quitting smoking now greatly reduces serious risk to your health. Obesity, smoking, and sedentary lifestyle greatly increases your risk for illness A healthy diet, regular physical exercise & weight monitoring are important for maintaining a healthy lifestyle You may be retaining fluid if you have a history of heart failure or if you experience any of the following symptoms:  Weight gain of 3 pounds or more overnight or 5 pounds in a week, increased swelling in our hands or feet or shortness of breath while lying flat in bed. Please call your doctor as soon as you notice any of these symptoms; do not wait until your next office visit. Recognize signs and symptoms of STROKE: 
 
F-face looks uneven A-arms unable to move or move unevenly S-speech slurred or non-existent T-time-call 911 as soon as signs and symptoms begin-DO NOT go Back to bed or wait to see if you get better-TIME IS BRAIN. The discharge information has been reviewed with the patient and spouse. The patient and spouse verbalized understanding. 
  
 
 
  
  
  
memloom Announcement We are excited to announce that we are making your provider's discharge notes available to you in memloom. You will see these notes when they are completed and signed by the physician that discharged you from your recent hospital stay. If you have any questions or concerns about any information you see in memloom, please call the Health Information Department where you were seen or reach out to your Primary Care Provider for more information about your plan of care. Introducing [facility]! [employer] introduces memloom patient portal. Now you can access parts of your medical record, email your doctor's office, and request medication refills online. 1. In your internet browser, go to [email or web address] 2. Click on the First Time User? Click Here link in the Sign In box. You will see the New Member Sign Up page. 3. Enter your memloom Access Code exactly as it appears below. You will not need to use this code after youve completed the sign-up process. If you do not sign up before the expiration date, you must request a new code. · memloom Access Code: [email or web address] Expires: 8/11/2018  6:14 AM 
 
4. Enter the last four digits of your Social Security Number (xxxx) and Date of Birth (mm/dd/yyyy) as indicated and click Submit. You will be taken to the next sign-up page. 5. Create a memloom ID. This will be your memloom login ID and cannot be changed, so think of one that is secure and easy to remember. 6. Create a [employer] password. You can change your password at any time. 7. Enter your Password Reset Question and Answer. This can be used at a later time if you forget your password. 8. Enter your e-mail address. You will receive e-mail notification when new information is available in [address] 9. Click Sign Up. You can now view and download portions of your medical record. 10. Click the Download Summary menu link to download a portable copy of your medical information. If you have questions, please visit the Frequently Asked Questions section of the tidy website. Remember, tidy is NOT to be used for urgent needs. For medical emergencies, dial 911. Now available from your iPhone and Android! Introducing Dave Sanz As a [facility] patient, I wanted to make you aware of our electronic visit tool called Dave Conklinfin. [employer] allows you to connect within minutes with a medical provider 24 hours a day, seven days a week via a mobile device or tablet or logging into a secure website from your computer. You can access Dave [employer] from anywhere in the [place]. A virtual visit might be right for you when you have a simple condition and feel like you just dont want to get out of bed, or cant get away from work for an appointment, when your regular [facility] provider is not available (evenings, weekends or holidays), or when youre out of town and need minor care. Electronic visits cost only $49 and if the [employer] provider determines a prescription is needed to treat your condition, one can be electronically transmitted to a nearby pharmacy*. Please take a moment to enroll today if you have not already done so. The enrollment process is free and takes just a few minutes. To enroll, please download the [employer] dipti to your tablet or phone, or visit [email or web address] to enroll on your computer.    
And, as an [address] [place] [address] patient with a [employer] account, the results of your visits will be scanned into your electronic medical record and your primary care provider will be able to view the scanned results. We urge you to continue to see your regular [employer] provider for your ongoing medical care. And while your primary care provider may not be the one available when you seek a Dave Sanz virtual visit, the peace of mind you get from getting a real diagnosis real time can be priceless. For more information on Dave Conklinfin, view our Frequently Asked Questions (FAQs) at [email or web address]. Sincerely, 
 
Marie Griffin MD 
Chief Medical Officer [address] Joe *:  certain medications cannot be prescribed via [employer] Providers Seen During Your Hospitalization Provider Specialty Primary office phone William Pearce MD Emergency Medicine [phone] Abigail Yu, [address] [place] [address] [phone] Your Primary Care Physician (PCP) Primary Care Physician Office Phone Office Fax Ciarra Mullinsradha [phone] You are allergic to the following Allergen Reactions Pcn (Penicillins) Rash Recent Documentation Height Weight BMI Smoking Status 1.753 m 117 kg 38.09 kg/m2 Never Smoker Emergency Contacts Name Discharge Info Relation Home Work Mobile [address] CAREGIVER [3] Spouse [3] [phone] Patient Belongings The following personal items are in your possession at time of discharge: 
  Dental Appliances: None  Visual Aid: At bedside      Home Medications: None   Jewelry: None  Clothing: Footwear, Pants, Shirt, Socks, Undergarments, With patient    Other Valuables: At bedside, Cell Phone Please provide this summary of care documentation to your next provider.  
  
  
 
  
Signatures-by signing, you are acknowledging that this After Visit Summary has been reviewed with you and you have received a copy. Patient Signature:  ____________________________________________________________ Date:  ____________________________________________________________  
  
Lauren [place] Provider Signature:  ____________________________________________________________ Date:  ____________________________________________________________

## 2018-05-13 NOTE — ED NOTES
Pt reporting increasing fullness sensation to his abdomen, as well as, increased SOB. Abdominal distention noted to be increasing. Pt's wife noted that pt feels very warm. Checked pt's oral temp, but pt has been mouth breathing d/t reported pain and SOB. Orally, temp read 98.0. Axillary temp read 101.3. Will make MD aware and address per MD order.

## 2018-05-13 NOTE — PROGRESS NOTES
TRANSFER - IN REPORT:    Verbal report received from Selin Muellerdayana Taveras (name) on Ferna Pencil  being received from ED (unit) for routine progression of care      Report consisted of patients Situation, Background, Assessment and   Recommendations(SBAR). Information from the following report(s) SBAR, Kardex, Intake/Output and MAR was reviewed with the receiving nurse. Opportunity for questions and clarification was provided. Assessment completed upon patients arrival to unit and care assumed.

## 2018-05-13 NOTE — ED NOTES
TRANSFER - OUT REPORT:    Verbal report given to Karyle Skiff, RN on behalf of receiving RN who is unable to take report(name) on Adalgisa Chappell  being transferred to [facility](unit) for routine progression of care       Report consisted of patients Situation, Background, Assessment and   Recommendations(SBAR). Information from the following report(s) SBAR, ED Summary, Intake/Output, MAR and Recent Results was reviewed with the receiving nurse. Lines:   Peripheral IV 05/13/18 Right Hand (Active)   Site Assessment Clean, dry, & intact 5/13/2018  6:41 AM   Phlebitis Assessment 0 5/13/2018  6:41 AM   Infiltration Assessment 0 5/13/2018  6:41 AM   Dressing Status Clean, dry, & intact 5/13/2018  6:41 AM   Dressing Type 4 X 4 5/13/2018  6:41 AM   Hub Color/Line Status Pink 5/13/2018  6:41 AM        Opportunity for questions and clarification was provided.       Patient transported with:  Transport

## 2018-05-14 ENCOUNTER — APPOINTMENT (OUTPATIENT)
Dept: GENERAL RADIOLOGY | Age: 53
DRG: 390 | End: 2018-05-14
Attending: FAMILY MEDICINE
Payer: COMMERCIAL

## 2018-05-14 LAB
ALBUMIN SERPL-MCNC: 2.7 G/DL (ref 3.5–5)
ALBUMIN/GLOB SERPL: 0.7 {RATIO} (ref 1.1–2.2)
ALP SERPL-CCNC: 52 U/L (ref 45–117)
ALT SERPL-CCNC: 27 U/L (ref 12–78)
ANION GAP SERPL CALC-SCNC: 4 MMOL/L (ref 5–15)
AST SERPL-CCNC: 13 U/L (ref 15–37)
ATRIAL RATE: 128 BPM
BACTERIA SPEC CULT: NORMAL
BILIRUB SERPL-MCNC: 0.6 MG/DL (ref 0.2–1)
BUN SERPL-MCNC: 18 MG/DL (ref 6–20)
BUN/CREAT SERPL: 19 (ref 12–20)
CALCIUM SERPL-MCNC: 8.7 MG/DL (ref 8.5–10.1)
CALCULATED P AXIS, ECG09: 17 DEGREES
CALCULATED R AXIS, ECG10: -56 DEGREES
CALCULATED T AXIS, ECG11: 62 DEGREES
CC UR VC: NORMAL
CHLORIDE SERPL-SCNC: 108 MMOL/L (ref 97–108)
CO2 SERPL-SCNC: 28 MMOL/L (ref 21–32)
CREAT SERPL-MCNC: 0.97 MG/DL (ref 0.7–1.3)
DIAGNOSIS, 93000: NORMAL
ERYTHROCYTE [DISTWIDTH] IN BLOOD BY AUTOMATED COUNT: 14.6 % (ref 11.5–14.5)
GLOBULIN SER CALC-MCNC: 4.1 G/DL (ref 2–4)
GLUCOSE SERPL-MCNC: 142 MG/DL (ref 65–100)
HCT VFR BLD AUTO: 39.5 % (ref 36.6–50.3)
HGB BLD-MCNC: 13.1 G/DL (ref 12.1–17)
MCH RBC QN AUTO: 28.6 PG (ref 26–34)
MCHC RBC AUTO-ENTMCNC: 33.2 G/DL (ref 30–36.5)
MCV RBC AUTO: 86.2 FL (ref 80–99)
NRBC # BLD: 0 K/UL (ref 0–0.01)
NRBC BLD-RTO: 0 PER 100 WBC
P-R INTERVAL, ECG05: 128 MS
PLATELET # BLD AUTO: 318 K/UL (ref 150–400)
PMV BLD AUTO: 9.6 FL (ref 8.9–12.9)
POTASSIUM SERPL-SCNC: 3.6 MMOL/L (ref 3.5–5.1)
PROT SERPL-MCNC: 6.8 G/DL (ref 6.4–8.2)
Q-T INTERVAL, ECG07: 296 MS
QRS DURATION, ECG06: 94 MS
QTC CALCULATION (BEZET), ECG08: 432 MS
RBC # BLD AUTO: 4.58 M/UL (ref 4.1–5.7)
SERVICE CMNT-IMP: NORMAL
SODIUM SERPL-SCNC: 140 MMOL/L (ref 136–145)
VENTRICULAR RATE, ECG03: 128 BPM
WBC # BLD AUTO: 3.7 K/UL (ref 4.1–11.1)

## 2018-05-14 PROCEDURE — 74011000258 HC RX REV CODE- 258: Performed by: FAMILY MEDICINE

## 2018-05-14 PROCEDURE — 85027 COMPLETE CBC AUTOMATED: CPT | Performed by: FAMILY MEDICINE

## 2018-05-14 PROCEDURE — 74011250636 HC RX REV CODE- 250/636: Performed by: FAMILY MEDICINE

## 2018-05-14 PROCEDURE — 74011250636 HC RX REV CODE- 250/636: Performed by: SURGERY

## 2018-05-14 PROCEDURE — 74019 RADEX ABDOMEN 2 VIEWS: CPT

## 2018-05-14 PROCEDURE — 77030019563 HC DEV ATTCH FEED HOLL -A

## 2018-05-14 PROCEDURE — 65270000029 HC RM PRIVATE

## 2018-05-14 PROCEDURE — 74011000250 HC RX REV CODE- 250: Performed by: SURGERY

## 2018-05-14 PROCEDURE — 36415 COLL VENOUS BLD VENIPUNCTURE: CPT | Performed by: FAMILY MEDICINE

## 2018-05-14 PROCEDURE — 80053 COMPREHEN METABOLIC PANEL: CPT | Performed by: FAMILY MEDICINE

## 2018-05-14 RX ORDER — LEVOFLOXACIN 5 MG/ML
750 INJECTION, SOLUTION INTRAVENOUS EVERY 24 HOURS
Status: DISCONTINUED | OUTPATIENT
Start: 2018-05-14 | End: 2018-05-16 | Stop reason: HOSPADM

## 2018-05-14 RX ORDER — METRONIDAZOLE 500 MG/100ML
500 INJECTION, SOLUTION INTRAVENOUS EVERY 12 HOURS
Status: DISCONTINUED | OUTPATIENT
Start: 2018-05-14 | End: 2018-05-16 | Stop reason: HOSPADM

## 2018-05-14 RX ORDER — ACETAMINOPHEN 10 MG/ML
1000 INJECTION, SOLUTION INTRAVENOUS ONCE
Status: COMPLETED | OUTPATIENT
Start: 2018-05-14 | End: 2018-05-15

## 2018-05-14 RX ADMIN — FAMOTIDINE 20 MG: 20 INJECTION, SOLUTION INTRAVENOUS at 22:00

## 2018-05-14 RX ADMIN — Medication 10 ML: at 05:52

## 2018-05-14 RX ADMIN — DEXTROSE MONOHYDRATE, SODIUM CHLORIDE, AND POTASSIUM CHLORIDE 125 ML/HR: 50; 9; 1.49 INJECTION, SOLUTION INTRAVENOUS at 17:43

## 2018-05-14 RX ADMIN — LEVOFLOXACIN 750 MG: 5 INJECTION, SOLUTION INTRAVENOUS at 10:18

## 2018-05-14 RX ADMIN — MORPHINE SULFATE 2 MG: 10 INJECTION INTRAVENOUS at 05:51

## 2018-05-14 RX ADMIN — ACETAMINOPHEN 1000 MG: 10 INJECTION, SOLUTION INTRAVENOUS at 17:41

## 2018-05-14 RX ADMIN — METRONIDAZOLE 500 MG: 500 INJECTION, SOLUTION INTRAVENOUS at 12:03

## 2018-05-14 RX ADMIN — METRONIDAZOLE 500 MG: 500 INJECTION, SOLUTION INTRAVENOUS at 23:48

## 2018-05-14 RX ADMIN — CEFTRIAXONE 1 G: 1 INJECTION, POWDER, FOR SOLUTION INTRAMUSCULAR; INTRAVENOUS at 08:29

## 2018-05-14 RX ADMIN — DEXTROSE MONOHYDRATE, SODIUM CHLORIDE, AND POTASSIUM CHLORIDE 125 ML/HR: 50; 9; 1.49 INJECTION, SOLUTION INTRAVENOUS at 05:49

## 2018-05-14 RX ADMIN — FAMOTIDINE 20 MG: 20 INJECTION, SOLUTION INTRAVENOUS at 08:26

## 2018-05-14 NOTE — PROGRESS NOTES
General Surgery End of Shift Nursing Note    Bedside shift change report given to Betsy Larson RN (oncoming nurse) by Crista Burgos. Katheryn Gutierrez RN (offgoing nurse). Report included the following information SBAR, Kardex, Intake/Output, MAR and Recent Results. Shift worked:   7a to [employer] of shift:    NGT to LWS and pt up in halls ambulating byself   Issues for physician to address:   none     Number times ambulated in hallway past shift: 2    Number of times OOB to chair past shift: 1    Pain Management:  Current medication: none  Patient states pain is manageable on current pain medication: YES    GI:    Current diet:  DIET NPO    Tolerating current diet: YES  Passing flatus: YES  Last Bowel Movement: today   Appearance: watery    Respiratory:    Incentive Spirometer at bedside: YES  Patient instructed on use: YES    Patient Safety:    Falls Score: 1  Bed Alarm On? No  Sitter?  No    Vickie Perez RN

## 2018-05-14 NOTE — PROGRESS NOTES
Bedside and Verbal shift change report given to Steffanie Holloway (oncoming nurse) by Frandy Farley RN (offgoing nurse). Report included the following information SBAR, Kardex, Procedure Summary, Intake/Output, MAR and Recent Results.

## 2018-05-14 NOTE — PROGRESS NOTES
Bedside and Verbal shift change report given to Sarah Gaviria RN (oncoming nurse) by Sindhu cha RN (offgoing nurse). Report included the following information SBAR, Kardex, Intake/Output and MAR.       1930- pt had a BM.

## 2018-05-14 NOTE — PROGRESS NOTES
Admit Date: 2018    POD * No surgery found *    Procedure:  * No surgery found *    Subjective:     Patient has no new complaints. + flatus and BM    Objective:     Blood pressure 128/77, pulse 81, temperature 98.1 °F (36.7 °C), resp. rate 16, height 5' 9\" (1.753 m), weight 257 lb 15 oz (117 kg), SpO2 98 %. Temp (24hrs), Av.3 °F (36.8 °C), Min:97.6 °F (36.4 °C), Max:100.3 °F (37.9 °C)      Physical Exam:  GENERAL: alert, cooperative, no distress, appears stated age, LUNG: clear to auscultation bilaterally, HEART: regular rate and rhythm, ABDOMEN: soft, non-tender, remains distended. Wounds c/d/i, EXTREMITIES:  extremities normal, atraumatic, no cyanosis or edema    Labs:   Recent Results (from the past 24 hour(s))   METABOLIC PANEL, COMPREHENSIVE    Collection Time: 18  4:56 AM   Result Value Ref Range    Sodium 140 136 - 145 mmol/L    Potassium 3.6 3.5 - 5.1 mmol/L    Chloride 108 97 - 108 mmol/L    CO2 28 21 - 32 mmol/L    Anion gap 4 (L) 5 - 15 mmol/L    Glucose 142 (H) 65 - 100 mg/dL    BUN 18 6 - 20 MG/DL    Creatinine 0.97 0.70 - 1.30 MG/DL    BUN/Creatinine ratio 19 12 - 20      GFR est AA >60 >60 ml/min/1.73m2    GFR est non-AA >60 >60 ml/min/1.73m2    Calcium 8.7 8.5 - 10.1 MG/DL    Bilirubin, total 0.6 0.2 - 1.0 MG/DL    ALT (SGPT) 27 12 - 78 U/L    AST (SGOT) 13 (L) 15 - 37 U/L    Alk.  phosphatase 52 45 - 117 U/L    Protein, total 6.8 6.4 - 8.2 g/dL    Albumin 2.7 (L) 3.5 - 5.0 g/dL    Globulin 4.1 (H) 2.0 - 4.0 g/dL    A-G Ratio 0.7 (L) 1.1 - 2.2     CBC W/O DIFF    Collection Time: 18  4:56 AM   Result Value Ref Range    WBC 3.7 (L) 4.1 - 11.1 K/uL    RBC 4.58 4.10 - 5.70 M/uL    HGB 13.1 12.1 - 17.0 g/dL    HCT 39.5 36.6 - 50.3 %    MCV 86.2 80.0 - 99.0 FL    MCH 28.6 26.0 - 34.0 PG    MCHC 33.2 30.0 - 36.5 g/dL    RDW 14.6 (H) 11.5 - 14.5 %    PLATELET 648 456 - 112 K/uL    MPV 9.6 8.9 - 12.9 FL    NRBC 0.0 0  WBC    ABSOLUTE NRBC 0.00 0.00 - 0.01 K/uL       Data Review images and reports reviewed    Assessment:     Active Problems:    SBO (small bowel obstruction) ([address] (5/13/2018)        Plan/Recommendations/Medical Decision Making:     Continue present treatment   Significant NG o/p  Films today still show obstructed pattern with air fluid levels and markedly dilated small bowel. Contrast from preop CT remains in colon. Pt POD3 lap append[facility] at [facility], states was not perforated. Appears to be ileus, clinically improving but not ready to remove NGT yet. Change to levaquin and flagyl. Anuel Perera.  Chanel Griffin MD, [facility]

## 2018-05-14 NOTE — PROGRESS NOTES
General Surgery End of Shift Nursing Note    Bedside shift change report given to Micheal Almendarez (oncoming nurse) by Graciela Oh (offgoing nurse). Report included the following information SBAR, Kardex, Intake/Output, MAR and Recent Results. Shift worked:  3977-0511   Summary of shift:    Ambulating, has had total of 3 BM today. Having gas pains. One time dose of Ofirmev given for headache. Issues for physician to address:   none     Number times ambulated in hallway past shift: 2    Number of times OOB to chair past shift: 1    Pain Management:  Current medication: none  Patient states pain is manageable on current pain medication: YES    GI:    Current diet:  DIET NPO    Tolerating current diet: YES  Passing flatus: YES  Last Bowel Movement: today   Appearance: watery    Respiratory:    Incentive Spirometer at bedside: YES  Patient instructed on use: YES    Patient Safety:    Falls Score: 1  Bed Alarm On? No  Sitter?  No    Marc Davey

## 2018-05-14 NOTE — CDMP QUERY
2 of 2    Dr. Sherryl Cogan :    The [address] [place] [address] has issued a statement indicating that, \"Individuals who are overweight, obese, or morbidly obese are at an increased risk for certain medical conditions when compared to persons of normal weight. Therefore, these conditions are always clinically significant and reportable when documented by the provider. \"    Review of the documentation for this patient demonstrates the clinical indicators of a BMI of 38.09, height of 5ft9in and a weight of 257 lbs. Please clarify if the patient has a diagnosis associated with those findings:    =>Obesity  (BMI: 30-39.9)  =>Morbid Obesity (BMI: >40.0)  =>Overweight  (BMI: 25-29.9)  =>Other weight status (specify status)  =>Unable to determine    Thank You,   [email or web address]  [phone]

## 2018-05-14 NOTE — CDMP QUERY
1 of 2    Dr. Grimes Divers :    Please clarify if this patient is (was) being treated/managed for:     => Systemic inflammatory response syndrome (SIRS) of non-infectious origin POA  =>Sepsis POA  => Other explanation of clinical findings  => Clinically Undetermined (no explanation for clinical findings)    The medical record reflects the following clinical findings, treatment, and risk factors. Risk Factors:  52 WM w/hx recent appendectomy  Clinical Indicators:  Admitted with SBO with admit Temp: 101.3, HR: , RR: 21-26 and a UTI  Treatment: Rocephin IV and IV fluids    Please clarify and document your clinical opinion in the progress notes and discharge summary including the definitive and/or presumptive diagnosis, (suspected or probable), related to the above clinical findings. Please include clinical findings supporting your diagnosis. Thank [email or web address]  [phone]

## 2018-05-14 NOTE — PROGRESS NOTES
Pharmacy Automatic Renal Dosing Protocol - Antimicrobials    Indication for Antimicrobials: intra-abdominal, SBO    Current Regimen of Each Antimicrobial:  Levofloxacin 750 mg every 24 hours (Start Date ; Day # 1)  Metronidazole 500 mg every 8 hours (, day 1)    Previous Antimicrobial Therapy:  Ceftriaxone 1 gm every 24 hours (-)    Significant Cultures:    urine: pending    Radiology / Imaging results: (X-ray, CT scan or MRI):     Paralysis, amputations, malnutrition:     Labs:  Recent Labs      18   0456  18   0644   CREA  0.97  1.12   BUN  18  15   WBC  3.7*  10.8     Temp (24hrs), Av.3 °F (36.8 °C), Min:97.6 °F (36.4 °C), Max:100.3 °F (37.9 °C)    Creatinine Clearance (mL/min) or Dialysis: 100    Impression/Plan:   · Continue levofloxacin 750 mg every 24 hours  · Metronidazole 500 mg every 12 hours per protocol  · Antimicrobial stop date pending     Pharmacy will follow daily and adjust medications as appropriate for renal function and/or serum levels.     Thank you,  Shameka Avalos, PHARMD

## 2018-05-15 ENCOUNTER — APPOINTMENT (OUTPATIENT)
Dept: GENERAL RADIOLOGY | Age: 53
DRG: 390 | End: 2018-05-15
Attending: SURGERY
Payer: COMMERCIAL

## 2018-05-15 PROCEDURE — 65270000029 HC RM PRIVATE

## 2018-05-15 PROCEDURE — 74011000250 HC RX REV CODE- 250: Performed by: SURGERY

## 2018-05-15 PROCEDURE — 74011250636 HC RX REV CODE- 250/636: Performed by: SURGERY

## 2018-05-15 PROCEDURE — 74011636320 HC RX REV CODE- 636/320: Performed by: SURGERY

## 2018-05-15 PROCEDURE — 74011250637 HC RX REV CODE- 250/637: Performed by: FAMILY MEDICINE

## 2018-05-15 PROCEDURE — 74011250636 HC RX REV CODE- 250/636: Performed by: FAMILY MEDICINE

## 2018-05-15 PROCEDURE — 74019 RADEX ABDOMEN 2 VIEWS: CPT

## 2018-05-15 RX ORDER — KETOROLAC TROMETHAMINE 30 MG/ML
30 INJECTION, SOLUTION INTRAMUSCULAR; INTRAVENOUS
Status: DISCONTINUED | OUTPATIENT
Start: 2018-05-15 | End: 2018-05-16 | Stop reason: HOSPADM

## 2018-05-15 RX ADMIN — DEXTROSE MONOHYDRATE, SODIUM CHLORIDE, AND POTASSIUM CHLORIDE 125 ML/HR: 50; 9; 1.49 INJECTION, SOLUTION INTRAVENOUS at 16:14

## 2018-05-15 RX ADMIN — KETOROLAC TROMETHAMINE 30 MG: 30 INJECTION, SOLUTION INTRAMUSCULAR at 15:42

## 2018-05-15 RX ADMIN — METRONIDAZOLE 500 MG: 500 INJECTION, SOLUTION INTRAVENOUS at 12:58

## 2018-05-15 RX ADMIN — METRONIDAZOLE 500 MG: 500 INJECTION, SOLUTION INTRAVENOUS at 23:18

## 2018-05-15 RX ADMIN — DIATRIZOATE MEGLUMINE AND DIATRIZOATE SODIUM 120 ML: 660; 100 LIQUID ORAL; RECTAL at 15:44

## 2018-05-15 RX ADMIN — FAMOTIDINE 20 MG: 20 INJECTION, SOLUTION INTRAVENOUS at 09:13

## 2018-05-15 RX ADMIN — Medication 1 LOZENGE: at 15:42

## 2018-05-15 RX ADMIN — FAMOTIDINE 20 MG: 20 INJECTION, SOLUTION INTRAVENOUS at 21:20

## 2018-05-15 RX ADMIN — LEVOFLOXACIN 750 MG: 5 INJECTION, SOLUTION INTRAVENOUS at 09:13

## 2018-05-15 NOTE — PROGRESS NOTES
Bedside verbal report given to [id number] [place] [address] Road (oncoming nurse) with SBAR, Kardex, MAR, and recent results reviewed. Nicanor: 22, Adrien: 1.

## 2018-05-15 NOTE — ROUTINE PROCESS
General Surgery End of Shift Nursing Note    Bedside shift change report given to Antione Jenkins RN (oncoming nurse) by Adan Reynoso RN (offgoing nurse). Report included the following information SBAR. Shift worked:   7a-7p   Summary of shift:         Issues for physician to address:          Number times ambulated in hallway past shift: 1    Number of times OOB to chair past shift: 3    Pain Management:  Current medication: torodol   Patient states pain is manageable on current pain medication: yes    GI:    Current diet:  DIET NPO    Tolerating current diet: YES  Passing flatus: YES  Last Bowel Movement: today   Appearance: small liquid    Respiratory:    Incentive Spirometer at bedside: YES  Patient instructed on use: YES    Patient Safety:    Falls Score: 1  Bed Alarm On? No  Sitter?  No    Jayla Bowden RN

## 2018-05-15 NOTE — PROGRESS NOTES
Admit Date: 2018    POD * No surgery found *    Procedure:  * No surgery found *    Subjective:     Patient has no new complaints. + flatus and some BM but still sense of tenesmus    Objective:     Blood pressure 133/71, pulse 79, temperature 97.6 °F (36.4 °C), resp. rate 18, height 5' 9\" (1.753 m), weight 257 lb 15 oz (117 kg), SpO2 97 %. Temp (24hrs), Av.9 °F (36.6 °C), Min:97.6 °F (36.4 °C), Max:98.3 °F (36.8 °C)      Physical Exam:  GENERAL: alert, cooperative, no distress, appears stated age, LUNG: clear to auscultation bilaterally, HEART: regular rate and rhythm, ABDOMEN: soft, non-tender, remains distended. Wounds c/d/i, EXTREMITIES:  extremities normal, atraumatic, no cyanosis or edema    Labs:   No results found for this or any previous visit (from the past 24 hour(s)). Data Review images and reports reviewed    Assessment:     Active Problems:    SBO (small bowel obstruction) ([facility] [address] (2018)        Plan/Recommendations/Medical Decision Making:     Continue present treatment   Significant NG o/p  Films today still show obstructed pattern with stepped air fluid levels and markedly dilated small bowel, unchanged from yesterday. Pt POD4 lap appendectomy at [facility], was gangrenous per op report  Will give gastrografin via NGT today and KUB in am.  If contrast into colon, will d/c NGT and trial of po. If contrast not in colon, will consider surgical reexploration. Tisha Lee.  Jaja Price MD, [facility]

## 2018-05-16 ENCOUNTER — APPOINTMENT (OUTPATIENT)
Dept: GENERAL RADIOLOGY | Age: 53
DRG: 390 | End: 2018-05-16
Attending: SURGERY
Payer: COMMERCIAL

## 2018-05-16 VITALS
BODY MASS INDEX: 38.2 KG/M2 | RESPIRATION RATE: 16 BRPM | DIASTOLIC BLOOD PRESSURE: 80 MMHG | WEIGHT: 257.94 LBS | HEIGHT: 69 IN | OXYGEN SATURATION: 98 % | HEART RATE: 81 BPM | SYSTOLIC BLOOD PRESSURE: 147 MMHG | TEMPERATURE: 98.1 F

## 2018-05-16 PROCEDURE — 74011250636 HC RX REV CODE- 250/636: Performed by: SURGERY

## 2018-05-16 PROCEDURE — 74019 RADEX ABDOMEN 2 VIEWS: CPT

## 2018-05-16 PROCEDURE — 74011250636 HC RX REV CODE- 250/636: Performed by: FAMILY MEDICINE

## 2018-05-16 PROCEDURE — 74011000250 HC RX REV CODE- 250: Performed by: SURGERY

## 2018-05-16 RX ADMIN — Medication 10 ML: at 08:31

## 2018-05-16 RX ADMIN — DEXTROSE MONOHYDRATE, SODIUM CHLORIDE, AND POTASSIUM CHLORIDE 125 ML/HR: 50; 9; 1.49 INJECTION, SOLUTION INTRAVENOUS at 06:31

## 2018-05-16 RX ADMIN — FAMOTIDINE 20 MG: 20 INJECTION, SOLUTION INTRAVENOUS at 08:30

## 2018-05-16 RX ADMIN — METRONIDAZOLE 500 MG: 500 INJECTION, SOLUTION INTRAVENOUS at 11:44

## 2018-05-16 RX ADMIN — LEVOFLOXACIN 750 MG: 5 INJECTION, SOLUTION INTRAVENOUS at 09:31

## 2018-05-16 NOTE — DISCHARGE INSTRUCTIONS
Bowel Blockage (Intestinal Obstruction): Care Instructions  Your Care Instructions  A bowel blockage, also called an intestinal obstruction, can prevent gas, fluids, or solids from moving through the intestines normally. It can cause constipation and, rarely, diarrhea. You may have pain, nausea, vomiting, and cramping. Most of the time, complete blockages require a stay in the hospital and possibly surgery. But if your bowel is only partly blocked, your doctor may tell you to wait until it clears on its own and you are able to pass gas and stool. If so, there are things you can do at home to help make you feel better. If you have had surgery for a bowel blockage, there are things you can do at home to make sure you heal well. You can also make some changes to keep your bowel from becoming blocked again. Follow-up care is a key part of your treatment and safety. Be sure to make and go to all appointments, and call your doctor if you are having problems. It's also a good idea to know your test results and keep a list of the medicines you take. How can you care for yourself at home? If your doctor has told you to wait at home for a blockage to clear on its own:  · Follow your doctor's instructions. These may include eating a liquid diet to avoid complete blockage. · Be safe with medicines. Take your medicines exactly as prescribed. Call your doctor if you think you are having a problem with your medicine. · Put a heating pad set on low on your belly to relieve mild cramps and pain. To prevent another blockage  · Try to eat smaller amounts of food more often. For example, have 5 or 6 small meals throughout the day instead of 2 or 3 large meals. · Chew your food very well. Try to chew each bite about 20 times or until it is liquid. · Avoid high-fiber foods and raw fruits and vegetables with skins, husks, strings, or seeds.  These can form a ball of undigested material that can cause a blockage if a part of your bowel is scarred or narrowed. · Check with your doctor before you eat whole-grain products or use a fiber supplement such as Citrucel or Metamucil. · To help you have regular bowel movements, eat at regular times, do not strain during a bowel movement, and drink at least 8 to 10 glasses of water each day. If you have kidney, heart, or liver disease and have to limit fluids, talk with your doctor or before you increase the amount of fluids you drink. · Drink high-calorie liquid formulas if your doctor says to. Severe symptoms may make it hard for your body to take in vitamins and minerals. · Get regular exercise. It helps you digest your food better. Get at least 30 minutes of physical activity on most days of the week. Walking is a good choice. When should you call for help? Call your doctor now or seek immediate medical care if:  ? · You have a fever. ? · You are vomiting. ? · You have new or worse belly pain. ? · You cannot pass stools or gas. ? Watch closely for changes in your health, and be sure to contact your doctor if you have any problems. Where can you learn more? Go to http://awa-mike.info/. Enter T745 in the search box to learn more about \"Bowel Blockage (Intestinal Obstruction): Care Instructions. \"  Current as of: May 12, 2017  Content Version: 11.4  © 8821-4460 Healthwise, Incorporated. Care instructions adapted under license by [employer] (which disclaims liability or warranty for this information). If you have questions about a medical condition or this instruction, always ask your healthcare professional. [place] [address] any warranty or liability for your use of this information.       DISCHARGE SUMMARY from Nurse    The following personal items are in your possession at time of discharge:    Dental Appliances: None  Visual Aid: None  Home Medications: None  Jewelry: None  Clothing: None (left in in pt. room)  Other Valuables: None             PATIENT INSTRUCTIONS:        To prevent infection remember to use good handwashing given. Your surgeons phone number is [phone]  Dr. Ag Plata. [facility]  Or  [facility] surgeon who performed the original surgery. What to do at Home:  Recommended activity: Activity as tolerated. If you experience any of the following symptoms bloating, no bowel movement, Nausea or vomiting for longer than 4 hours or what brought you to the emergency department to begin with., please follow up with PCP or follow up with Dr. Ag Plata sooner than scheduled appointment. *  Please give a list of your current medications to your Primary Care Provider. *  Please update this list whenever your medications are discontinued, doses are      changed, or new medications (including over-the-counter products) are added. *  Please carry medication information at all times in case of emergency situations. These are general instructions for a healthy lifestyle:    No smoking/ No tobacco products/ Avoid exposure to second hand smoke    Surgeon General's Warning:  Quitting smoking now greatly reduces serious risk to your health. Obesity, smoking, and sedentary lifestyle greatly increases your risk for illness    A healthy diet, regular physical exercise & weight monitoring are important for maintaining a healthy lifestyle    You may be retaining fluid if you have a history of heart failure or if you experience any of the following symptoms:  Weight gain of 3 pounds or more overnight or 5 pounds in a week, increased swelling in our hands or feet or shortness of breath while lying flat in bed. Please call your doctor as soon as you notice any of these symptoms; do not wait until your next office visit.     Recognize signs and symptoms of STROKE:    F-face looks uneven    A-arms unable to move or move unevenly    S-speech slurred or non-existent    T-time-call 911 as soon as signs and symptoms begin-DO NOT go       Back to bed or wait to see if you get better-TIME IS BRAIN. The discharge information has been reviewed with the patient and spouse.   The patient and spouse verbalized understanding.

## 2018-05-16 NOTE — DISCHARGE SUMMARY
Physician Discharge Summary     Patient ID:  Quinn Mosley  [id number]  41 y.o.  1965    Admit Date: 5/13/2018    Discharge Date: 5/16/2018    Admission Diagnoses: SBO (small bowel obstruction) [facility])    Discharge Diagnoses: Active Problems:    SBO (small bowel obstruction) ([address] (5/13/2018)         Admission Condition: Poor    Discharge Condition: Good    Last Procedure: * No surgery found [facility] Course:   Pt admitted with obstructive symptoms and imaging c/w SBO 2 days following lap appendectomy at [facility]. This persisted for several days with NGT decompression, but ultimately resolved after administration of gastrografin. He is now matty po and having BMs normally. Consults: None    Disposition: home    Patient Instructions:   Current Discharge Medication List      CONTINUE these medications which have NOT CHANGED    Details   Cetirizine (ZYRTEC) 10 mg cap Take 10 mg by mouth. FOLBIC 2.5-25-2 mg tablet TAKE 1 TABLET BY MOUTH EVERY DAY  Refills: 5      esomeprazole (NEXIUM) 40 mg capsule       ezetimibe-simvastatin (VYTORIN) 10-40 mg per tablet TAKE 1 TABLET BY MOUTH EVERY DAY  Refills: 5           Activity: No heavy lifting for 4 weeks  Diet: Regular Diet  Wound Care: Keep wound clean and dry    Follow-up with [facility] Surgeon in 5 weeks.   Follow-up tests/labs none    Signed:  Bev Chua MD  [facility]  5/16/2018  8:50 AM

## 2018-05-16 NOTE — PROGRESS NOTES
Surgery      Pt's BMI of 38 classifies him as obese, POA. Abbe Oviedo.  Samantha Spivey MD, [facility]

## 2018-05-16 NOTE — ROUTINE PROCESS
Attempted to make follow up appointment with Dr Davis Vazquez but office said that they only accept appointments from PT after they have been discharged

## 2018-05-16 NOTE — PROGRESS NOTES
Reason for Admission:   Small Bowel Obstruction                   RRAT Score:        7             Plan for utilizing home health:      Pt was active, driving and independent with ADL's and IADL's prior to admission. Pt is up walking in room. Feeling better. NGT out and getting clear liquids today. Has large supportive network at home. Has not needed HH, Rehab/SNF in past. Only DME is crutches and CPAP machine. No HH needs assessed at this time. CM will continue to follow for changes to care and additional needs. Likelihood of Readmission:  low                         Transition of Care Plan:                      Patient is a 52year old  male admitted with Small Bowel Obstruction. CM met with pt and wife, introduced self and role. Patient alert/oriented and in no acute distress. Demographic information was reviewed/verified by pt. Preferred [facility] [email or web address], [facility] [address] Pt states medications covered by insurance and has no concerns regarding meds. Pt wife will transport him home at discharge. Pt states he will take himself to follow up appt but wife will assist if needed. They have no concerns or feel need for additional assistance at discharge at this time. CM will continue to follow pt hospital clinical progress. Care Management Interventions  PCP Verified by CM: Yes (sees Dr. Azalea Zimmer every 6 months, next appt monday, 5/21)  Palliative Care Criteria Met (RRAT>21 & CHF Dx)?: No  Mode of Transport at Discharge:  Other (see comment) (wife will transport home by car at discharge)  Transition of Care Consult (CM Consult): Discharge Planning  Discharge Durable Medical Equipment: No (none at home and none required at this time)  Physical Therapy Consult: No  Occupational Therapy Consult: No  Speech Therapy Consult: No  Current Support Network: Own Home, Lives with Spouse, Other (lives in a 1 story home with 4 steps into entrance with wife and son)  Confirm Follow Up Transport: Self (pt will transport self to follow up appt and wife will assist as needed)  Plan discussed with Pt/Family/Caregiver: Yes       Patient discharge order in computer. CM met with pt and wife. Discharge is pending for today sometime. Pt still has IV and is getting IV antibiotics. Tolerating diet so far. CM verified follow up appts in AVS. No further needs from CM for discharge.     Rui Sim, RN  [phone]

## 2018-05-16 NOTE — PROGRESS NOTES
Pt states he feels a little bloated after lunch but anxious to go home. D/C instructions reviewed with patient and spouse with verbalized understanding. No prescriptions given to patient. IV removed from Right hand with catheter intact.

## 2018-05-16 NOTE — PROGRESS NOTES
Spiritual Care Partner Volunteer visited patient in Gen Surg on May 16, 2018.   Documented by:  MARGARET Blue, [facility], ruben GIRARD [facility] Paging Service  [phone])

## 2018-05-17 ENCOUNTER — OFFICE VISIT (OUTPATIENT)
Dept: SLEEP MEDICINE | Age: 53
End: 2018-05-17

## 2018-05-17 ENCOUNTER — TELEPHONE (OUTPATIENT)
Dept: SURGERY | Age: 53
End: 2018-05-17

## 2018-05-17 VITALS
OXYGEN SATURATION: 100 % | BODY MASS INDEX: 38.06 KG/M2 | SYSTOLIC BLOOD PRESSURE: 120 MMHG | HEIGHT: 69 IN | HEART RATE: 77 BPM | WEIGHT: 257 LBS | DIASTOLIC BLOOD PRESSURE: 75 MMHG

## 2018-05-17 DIAGNOSIS — G47.33 OBSTRUCTIVE SLEEP APNEA SYNDROME: Primary | ICD-10-CM

## 2018-05-17 NOTE — PATIENT INSTRUCTIONS
[address] [place]. [place], [address]  Tel.  [phone]  Fax. [address] [facility] [address]  [place], [address]  Tel.  [phone]  Fax. [phone] [address] [facility] [address]  Tel.  [phone]  Fax. [phone]     PROPER SLEEP HYGIENE    What to avoid  · Do not have drinks with caffeine, such as coffee or black tea, for 8 hours before bed. · Do not smoke or use other types of tobacco near bedtime. Nicotine is a stimulant and can keep you awake. · Avoid drinking alcohol late in the evening, because it can cause you to wake in the middle of the night. · Do not eat a big meal close to bedtime. If you are hungry, eat a light snack. · Do not drink a lot of water close to bedtime, because the need to urinate may wake you up during the night. · Do not read or watch TV in bed. Use the bed only for sleeping and sexual activity. What to try  · Go to bed at the same time every night, and wake up at the same time every morning. Do not take naps during the day. · Keep your bedroom quiet, dark, and cool. · Get regular exercise, but not within 3 to 4 hours of your bedtime. .  · Sleep on a comfortable pillow and mattress. · If watching the clock makes you anxious, turn it facing away from you so you cannot see the time. · If you worry when you lie down, start a worry book. Well before bedtime, write down your worries, and then set the book and your concerns aside. · Try meditation or other relaxation techniques before you go to bed. · If you cannot fall asleep, get up and go to another room until you feel sleepy. Do something relaxing. Repeat your bedtime routine before you go to bed again. · Make your house quiet and calm about an hour before bedtime. Turn down the lights, turn off the TV, log off the computer, and turn down the volume on music. This can help you relax after a busy day.     Drowsy Driving  The [address] [place] [address] Road Traffic Safety Administration cites drowsiness as a causing factor in more than 961,660 police reported crashes annually, resulting in 76,000 injuries and 1,500 deaths. Other surveys suggest 55% of people polled have driven while drowsy in the past year, 23% had fallen asleep but not crashed, 3% crashed, and 2% had and accident due to drowsy driving. Who is at risk? Young Drivers: One study of drowsy driving accidents states that 55% of the drivers were under 25 years. Of those, 75% were male. Shift Workers and Travelers: People who work overnight or travel across time zones frequently are at higher risk of experiencing Circadian Rhythm Disorders. They are trying to work and function when their body is programed to sleep. Sleep Deprived: Lack of sleep has a serious impact on your ability to pay attention or focus on a task. Consistently getting less than the average of 8 hours your body needs creates partial or cumulative sleep deprivation. Untreated Sleep Disorders: Sleep Apnea, Narcolepsy, R.L.S., and other sleep disorders (untreated) prevent a person from getting enough restful sleep. This leads to excessive daytime sleepiness and increases the risk for drowsy driving accidents by up to 7 times. Medications / Alcohol: Even over the counter medications can cause drowsiness. Medications that impair a drivers attention should have a warning label. Alcohol naturally makes you sleepy and on its own can cause accidents. Combined with excessive drowsiness its effects are amplified. Signs of Drowsy Driving:   * You don't remember driving the last few miles   * You may drift out of your russell   * You are unable to focus and your thoughts wander   * You may yawn more often than normal   * You have difficulty keeping your eyes open / nodding off   * Missing traffic signs, speeding, or tailgating  Prevention-   Good sleep hygiene, lifestyle and behavioral choices have the most impact on drowsy driving.  There is no substitute for sleep and the average person requires 8 hours nightly. If you find yourself driving drowsy, stop and sleep. Consider the sleep hygiene tips provided during your visit as well. Medication Refill Policy: Refills for all medications require 1 week advance notice. Please have your pharmacy fax a refill request. We are unable to fax, or call in \"controled substance\" medications and you will need to pick these prescriptions up from our office. MovieLaLa Activation    Thank you for requesting access to MovieLaLa. Please follow the instructions below to securely access and download your online medical record. MovieLaLa allows you to send messages to your doctor, view your test results, renew your prescriptions, schedule appointments, and more. How Do I Sign Up? 1. In your internet browser, go to [email or web address]. 2. Click on the First Time User? Click Here link in the Sign In box. You will see the New Member Sign Up page. 3. Enter your MovieLaLa Access Code exactly as it appears below. You will not need to use this code after youve completed the sign-up process. If you do not sign up before the expiration date, you must request a new code. MovieLaLa Access Code: [email or web address]  Expires: 2018  6:14 AM (This is the date your MovieLaLa access code will )    4. Enter the last four digits of your Social Security Number (xxxx) and Date of Birth (mm/dd/yyyy) as indicated and click Submit. You will be taken to the next sign-up page. 5. Create a MovieLaLa ID. This will be your MovieLaLa login ID and cannot be changed, so think of one that is secure and easy to remember. 6. Create a MovieLaLa password. You can change your password at any time. 7. Enter your Password Reset Question and Answer. This can be used at a later time if you forget your password. 8. Enter your e-mail address. You will receive e-mail notification when new information is available in [address] 9. Click Sign Up.  You can now view and download portions of your medical record. 10. Click the Download Summary menu link to download a portable copy of your medical information. Additional Information    If you have questions, please call [phone]. Remember, PlayerPro is NOT to be used for urgent needs. For medical emergencies, dial 911.

## 2018-05-17 NOTE — TELEPHONE ENCOUNTER
Spoke with patient's wife [facility] she stated  was admitted for SBO and was d/c 05/16/2018. She stated was given medication via tube for constipation and since then he's been having diarrhea without warning and was taking stool softener. Instructed patient' wife to stop stool softener until I speak with provider.

## 2018-05-17 NOTE — PROGRESS NOTES
[address] [facility]. [place], [address]  Tel.  [phone]  Fax. 100 [facility] [address]  [place], [address] [facility]  Tel.  [phone]  Fax. [phone] [address] [facility] [address]  Tel.  [phone]  Fax. [phone]     S>Willis Whalen is a 52 y.o. male seen for a positive airway pressure follow-up. He reports no problems using the device. The following problems are identified:    Drowsiness no Problems exhaling no   Snoring no Forget to put on no   Mask Comfortable yes Can't fall asleep no   Dry Mouth no Mask falls off no   Air Leaking no Frequent awakenings no     Download reviewed. He admits that his sleep has improved. Therapy Apnea Index averaged over PAP use: 1 /hr which reflects significantly improved sleep breathing condition. Allergies   Allergen Reactions    Pcn [Penicillins] Rash       He has a current medication list which includes the following prescription(s): cetirizine, folbic, esomeprazole, and ezetimibe-simvastatin. Reyes Dials He  has no past medical history on file. [place] Sleepiness Score: 4   and Modified F.O.S.Q. Score Total / 2: 20   which reflect improved sleep quality over therapy time. O>    Visit Vitals    /75    Pulse 77    Ht 5' 9\" (1.753 m)    Wt 257 lb (116.6 kg)    SpO2 100%    BMI 37.95 kg/m2           General:   Alert, oriented, not in distress   Neck:   No JVD    Chest/Lungs:  symetrical lung expansion , no accessory muscle use    Extremities:  no obvious rashes , negative edema    Neuro:  No focal deficits ; No obvious tremor    Psych:  Normal affect ,  Normal countenance ;           A>    ICD-10-CM ICD-9-CM    1. Obstructive sleep apnea syndrome G47.33 327.23    2. BMI 37.0-37.9, adult Z68.37 V85.37      AHI = 75(11-17). On CPAP :  10-14 cmH2O. Compliant:      yes    Therapeutic Response:  Positive    P>      * Follow-up Disposition:  Return in about 1 year (around 5/17/2019).     * He was asked to contact our office for any problems regarding PAP therapy. * Counseling was provided regarding the importance of regular PAP use and on proper sleep hygiene and safe driving. * Re-enforced proper and regular cleaning for the device. 2.Obesity - I have counseled the patient regarding the benefits of weight loss.     Arsalan Harrison MD  Diplomate in Sleep Medicine  [facility]

## 2018-05-18 NOTE — TELEPHONE ENCOUNTER
Spoke with patient informed him per Dr. Nayana Stallworth continue not to take the stool softener and follow with his surgeon at [facility]. Patient also stated diarrhea/ no formed stool has lessen He verbalized understanding.

## 2019-05-20 ENCOUNTER — OFFICE VISIT (OUTPATIENT)
Dept: SLEEP MEDICINE | Age: 54
End: 2019-05-20

## 2019-05-20 VITALS
OXYGEN SATURATION: 98 % | SYSTOLIC BLOOD PRESSURE: 122 MMHG | HEIGHT: 69 IN | DIASTOLIC BLOOD PRESSURE: 78 MMHG | BODY MASS INDEX: 38.95 KG/M2 | HEART RATE: 78 BPM | WEIGHT: 263 LBS

## 2019-05-20 DIAGNOSIS — G47.33 OBSTRUCTIVE SLEEP APNEA (ADULT) (PEDIATRIC): Primary | ICD-10-CM

## 2019-05-20 DIAGNOSIS — E66.01 SEVERE OBESITY (HCC): ICD-10-CM

## 2019-05-20 NOTE — PROGRESS NOTES
217 [facility] [place]. [place], [address]  Tel.  [phone]  Fax. 100 [facility] 60  [place], [address] [facility]  Tel.  [phone]  Fax. [phone] [address]   Tel.  [phone]  Fax. [phone]     S>Willis Street is a 62 y.o. male seen for a positive airway pressure follow-up. He reports no problems using the device. The following problems are identified:    Drowsiness no Problems exhaling no   Snoring no Forget to put on no   Mask Comfortable yes Can't fall asleep no   Dry Mouth no Mask falls off no   Air Leaking no Frequent awakenings no     Download reviewed. He admits that his sleep has improved. Therapy Apnea Index averaged over PAP use: 1 /hr which reflects improved sleep breathing condition. Allergies   Allergen Reactions    Pcn [Penicillins] Rash       He has a current medication list which includes the following prescription(s): cetirizine, folbic, esomeprazole, and ezetimibe-simvastatin. Alma Rosa Noyola He  has no past medical history on file. [place] Sleepiness Score: 6   and Modified F.O.S.Q. Score Total / 2: 19.5   which reflect improved sleep quality over therapy time. O>    Visit Vitals  /78 (BP 1 Location: Right arm, BP Patient Position: Sitting)   Pulse 78   Ht 5' 9\" (1.753 m)   Wt 263 lb (119.3 kg)   SpO2 98%   BMI 38.84 kg/m²           General:   Alert, oriented, not in distress   Neck:   No JVD    Chest/Lungs:  symetrical lung expansion , no accessory muscle use    Extremities:  no obvious rashes , negative edema    Neuro:  No focal deficits ; No obvious tremor    Psych:  Normal affect ,  Normal countenance ;         A>    ICD-10-CM ICD-9-CM    1. Obstructive sleep apnea (adult) (pediatric) G47.33 327.23 AMB SUPPLY ORDER   2. Severe obesity ([facility] [address] E66.01 278.01      AHI = 75(11-17). On CPAP :  10-14 cmH2O.     Compliant:      yes    Therapeutic Response:  Positive    P>      *   Follow-up and Dispositions    · Return in about 1 year (around 5/20/2020). he is compliant with PAP therapy and PAP continues to benefit patient and remains necessary for control of his sleep apnea. Change setting to 10-15 cmH20   I have ordered replacement supplies      * He was asked to contact our office for any problems regarding PAP therapy. * Counseling was provided regarding the importance of regular PAP use and on proper sleep hygiene and safe driving. * Re-enforced proper and regular cleaning for the device. 2. Obesity - I have counseled the patient regarding the benefits of weight loss. I have reviewed/discussed the above normal BMI with the patient. I have recommended the following interventions: encourage exercise and monitor weight . Simone Beckman         Electronically signed by    Angel Salgado MD  Diplomate in Sleep Medicine  [facility]

## 2019-05-20 NOTE — PATIENT INSTRUCTIONS
217 [facility] [place]. [place], [address]  Tel.  [phone]  Fax. 100 [facility] 60  [place], [address] [facility] [address]  Tel.  [phone]  Fax. [phone] [address]  Tel.  [phone]  Fax. [phone]     PROPER SLEEP HYGIENE    What to avoid  · Do not have drinks with caffeine, such as coffee or black tea, for 8 hours before bed. · Do not smoke or use other types of tobacco near bedtime. Nicotine is a stimulant and can keep you awake. · Avoid drinking alcohol late in the evening, because it can cause you to wake in the middle of the night. · Do not eat a big meal close to bedtime. If you are hungry, eat a light snack. · Do not drink a lot of water close to bedtime, because the need to urinate may wake you up during the night. · Do not read or watch TV in bed. Use the bed only for sleeping and sexual activity. What to try  · Go to bed at the same time every night, and wake up at the same time every morning. Do not take naps during the day. · Keep your bedroom quiet, dark, and cool. · Get regular exercise, but not within 3 to 4 hours of your bedtime. .  · Sleep on a comfortable pillow and mattress. · If watching the clock makes you anxious, turn it facing away from you so you cannot see the time. · If you worry when you lie down, start a worry book. Well before bedtime, write down your worries, and then set the book and your concerns aside. · Try meditation or other relaxation techniques before you go to bed. · If you cannot fall asleep, get up and go to another room until you feel sleepy. Do something relaxing. Repeat your bedtime routine before you go to bed again. · Make your house quiet and calm about an hour before bedtime. Turn down the lights, turn off the TV, log off the computer, and turn down the volume on music. This can help you relax after a busy day.     Drowsy Driving  The [address] [place] [address] Road Traffic Safety Administration cites drowsiness as a causing factor in more than 768,557 police reported crashes annually, resulting in 76,000 injuries and 1,500 deaths. Other surveys suggest 55% of people polled have driven while drowsy in the past year, 23% had fallen asleep but not crashed, 3% crashed, and 2% had and accident due to drowsy driving. Who is at risk? Young Drivers: One study of drowsy driving accidents states that 55% of the drivers were under 25 years. Of those, 75% were male. Shift Workers and Travelers: People who work overnight or travel across time zones frequently are at higher risk of experiencing Circadian Rhythm Disorders. They are trying to work and function when their body is programed to sleep. Sleep Deprived: Lack of sleep has a serious impact on your ability to pay attention or focus on a task. Consistently getting less than the average of 8 hours your body needs creates partial or cumulative sleep deprivation. Untreated Sleep Disorders: Sleep Apnea, Narcolepsy, R.L.S., and other sleep disorders (untreated) prevent a person from getting enough restful sleep. This leads to excessive daytime sleepiness and increases the risk for drowsy driving accidents by up to 7 times. Medications / Alcohol: Even over the counter medications can cause drowsiness. Medications that impair a drivers attention should have a warning label. Alcohol naturally makes you sleepy and on its own can cause accidents. Combined with excessive drowsiness its effects are amplified. Signs of Drowsy Driving:   * You don't remember driving the last few miles   * You may drift out of your russell   * You are unable to focus and your thoughts wander   * You may yawn more often than normal   * You have difficulty keeping your eyes open / nodding off   * Missing traffic signs, speeding, or tailgating  Prevention-   Good sleep hygiene, lifestyle and behavioral choices have the most impact on drowsy driving.  There is no substitute for sleep and the average person requires 8 hours nightly. If you find yourself driving drowsy, stop and sleep. Consider the sleep hygiene tips provided during your visit as well. Medication Refill Policy: Refills for all medications require 1 week advance notice. Please have your pharmacy fax a refill request. We are unable to fax, or call in \"controled substance\" medications and you will need to pick these prescriptions up from our office. [employer] Activation    Thank you for requesting access to [employer]. Please follow the instructions below to securely access and download your online medical record. [employer] allows you to send messages to your doctor, view your test results, renew your prescriptions, schedule appointments, and more. How Do I Sign Up? 1. In your internet browser, go to [email or web address]. 2. Click on the First Time User? Click Here link in the Sign In box. You will see the New Member Sign Up page. 3. Enter your [employer] Access Code exactly as it appears below. You will not need to use this code after youve completed the sign-up process. If you do not sign up before the expiration date, you must request a new code. [employer] Access Code: [email or web address]  Expires: 2019  4:18 PM (This is the date your [employer] access code will )    4. Enter the last four digits of your Social Security Number (xxxx) and Date of Birth (mm/dd/yyyy) as indicated and click Submit. You will be taken to the next sign-up page. 5. Create a [employer] ID. This will be your [employer] login ID and cannot be changed, so think of one that is secure and easy to remember. 6. Create a [employer] password. You can change your password at any time. 7. Enter your Password Reset Question and Answer. This can be used at a later time if you forget your password. 8. Enter your e-mail address. You will receive e-mail notification when new information is available in [address] 9. Click Sign Up.  You can now view and download portions of your medical record. 10. Click the Download Summary menu link to download a portable copy of your medical information. Additional Information    If you have questions, please call [phone]. Remember, [employer] is NOT to be used for urgent needs. For medical emergencies, dial 911.

## 2019-05-22 ENCOUNTER — DOCUMENTATION ONLY (OUTPATIENT)
Dept: SLEEP MEDICINE | Age: 54
End: 2019-05-22

## 2020-06-02 ENCOUNTER — DOCUMENTATION ONLY (OUTPATIENT)
Dept: SLEEP MEDICINE | Age: 55
End: 2020-06-02

## 2020-06-02 ENCOUNTER — VIRTUAL VISIT (OUTPATIENT)
Dept: SLEEP MEDICINE | Age: 55
End: 2020-06-02

## 2020-06-02 VITALS — WEIGHT: 263 LBS | BODY MASS INDEX: 38.84 KG/M2

## 2020-06-02 DIAGNOSIS — E66.01 SEVERE OBESITY (HCC): ICD-10-CM

## 2020-06-02 DIAGNOSIS — G47.33 OBSTRUCTIVE SLEEP APNEA (ADULT) (PEDIATRIC): Primary | ICD-10-CM

## 2020-06-02 NOTE — PROGRESS NOTES
217 [facility]. [place], [facility]  Tel.  [phone]  Fax. 100 [facility] 60  [place], [address] [facility]  Tel.  [phone]  Fax. [phone] 9[address] [facility] [address]   Tel.  [phone]  Fax. [phone]       Telemedicine visit performed with verbal consent of the patient. Patient called and identity confirmed with 2 patient identifers    Patient was seen at home  Facundo Chan is a 60 y.o. male who was seen by synchronous (real-time) audio-video technology on 6/2/2020. Consent:  He and/or his healthcare decision maker is aware that this patient-initiated Telehealth encounter is the equivalent to a face to face encounter in the sleep disorder center and has provided verbal consent to proceed: Yes    I was at home while conducting this encounter. S>Willis Sykes is a 60 y.o. male seen at this telemedicine visit for a positive airway pressure follow-up. He reports no problems using the device. He is 100% compliant over the past 30 days. The following problems are identified:    Drowsiness no Problems exhaling no   Snoring no Forget to put on no   Mask Comfortable yes  Can't fall asleep no   Dry Mouth no Mask falls off no   Air Leaking no Frequent awakenings no       Download reviewed. He admits that his sleep has improved on PAP therapy using full mask and heated tubing. Allergies   Allergen Reactions    Pcn [Penicillins] Rash       He has a current medication list which includes the following prescription(s): cetirizine, esomeprazole, ezetimibe-simvastatin, and folbic. [place] Sleepiness Score: 4   and     which reflect improved sleep quality over therapy time.     O>      Visit Vitals  Wt 263 lb (119.3 kg)   BMI 38.84 kg/m²         Vital Signs: (As obtained by patient/caregiver at home)        Constitutional: [x] Appears well-developed and well-nourished [x] No apparent distress      [] Abnormal -     Mental status: [x] Alert and awake  [x] Oriented to person/place/time [x] Able to follow commands    [] Abnormal -     Eyes:   EOM    [x]  Normal    [] Abnormal -   Sclera  [x]  Normal    [] Abnormal -          Discharge [x]  None visible   [] Abnormal -     HENT: [x] Normocephalic, atraumatic  [] Abnormal -     External Ears [x] Normal  [] Abnormal -    Neck: [x] No visualized mass [] Abnormal -     Pulmonary/Chest: [x] Respiratory effort normal   [x] No visualized signs of difficulty breathing or respiratory distress        [] Abnormal -       Neurological:        [x] No Facial Asymmetry (Cranial nerve 7 motor function) (limited exam due to video visit)          [x] No gaze palsy        [] Abnormal -          Skin:        [x] No significant exanthematous lesions or discoloration noted on facial skin         [] Abnormal -            Psychiatric:       [x] Normal Affect [] Abnormal -        Other pertinent observable physical exam findings:-            A>    ICD-10-CM ICD-9-CM    1. Obstructive sleep apnea (adult) (pediatric) G47.33 327.23 AMB SUPPLY ORDER   2. Severe obesity (HCC) E66.01 278.01      AHI = 75 (11-17). On CPAP :  10-14 cmH2O. Compliant:      yes    Therapeutic Response:  Positive    P>    he is compliant with PAP therapy and PAP continues to benefit patient and remains necessary for control of his sleep apnea. CPAP setting - 10-14 cmH20     * We have recommended a dedicated weight loss through appropriate diet and an exercise regimen as significant weight reduction has been shown to reduce severity of obstructive sleep apnea. *   Follow-up and Dispositions    · Return in about 1 year (around 6/2/2021). I have ordered replacement supplies    * He was asked to contact our office for any problems regarding PAP therapy. * Counseling was provided regarding the importance of regular PAP use and on proper sleep hygiene and safe driving. * Re-enforced proper and regular cleaning for the device.   2. Obesity - I have counseled the patient regarding the benefits of weight loss. All of his questions were addressed. Pursuant to the emergency declaration under the [facility], Critical access hospital waiver authority and the [employer] Act, this Virtual  Visit was conducted, with patient's consent, to reduce the patient's risk of exposure to COVID-19 and provide continuity of care for an established patient. Services were provided through a video synchronous discussion virtually to substitute for in-person clinic visit.     Shavonne Chau MD    Electronically signed by    Eloisa Garrett MD  Diplomate in Sleep Medicine  [facility]

## 2020-06-02 NOTE — PATIENT INSTRUCTIONS
217 [facility]., [place]. [address] [facility], [address] Tel.  [phone] Fax. 100 [facility] 60 [place], [address] [facility] Tel.  [phone] Fax. [phone] [address] Tel.  [phone] Fax. [phone] PROPER SLEEP HYGIENE What to avoid · Do not have drinks with caffeine, such as coffee or black tea, for 8 hours before bed. · Do not smoke or use other types of tobacco near bedtime. Nicotine is a stimulant and can keep you awake. · Avoid drinking alcohol late in the evening, because it can cause you to wake in the middle of the night. · Do not eat a big meal close to bedtime. If you are hungry, eat a light snack. · Do not drink a lot of water close to bedtime, because the need to urinate may wake you up during the night. · Do not read or watch TV in bed. Use the bed only for sleeping and sexual activity. What to try · Go to bed at the same time every night, and wake up at the same time every morning. Do not take naps during the day. · Keep your bedroom quiet, dark, and cool. · Get regular exercise, but not within 3 to 4 hours of your bedtime. Milvia Alvarez · Sleep on a comfortable pillow and mattress. · If watching the clock makes you anxious, turn it facing away from you so you cannot see the time. · If you worry when you lie down, start a worry book. Well before bedtime, write down your worries, and then set the book and your concerns aside. · Try meditation or other relaxation techniques before you go to bed. · If you cannot fall asleep, get up and go to another room until you feel sleepy. Do something relaxing. Repeat your bedtime routine before you go to bed again. · Make your house quiet and calm about an hour before bedtime. Turn down the lights, turn off the TV, log off the computer, and turn down the volume on music. This can help you relax after a busy day. Drowsy Driving The [facility] 54 cites drowsiness as a causing factor in more than 653,685 police reported crashes annually, resulting in 76,000 injuries and 1,500 deaths. Other surveys suggest 55% of people polled have driven while drowsy in the past year, 23% had fallen asleep but not crashed, 3% crashed, and 2% had and accident due to drowsy driving. Who is at risk? Young Drivers: One study of drowsy driving accidents states that 55% of the drivers were under 25 years. Of those, 75% were male. Shift Workers and Travelers: People who work overnight or travel across time zones frequently are at higher risk of experiencing Circadian Rhythm Disorders. They are trying to work and function when their body is programed to sleep. Sleep Deprived: Lack of sleep has a serious impact on your ability to pay attention or focus on a task. Consistently getting less than the average of 8 hours your body needs creates partial or cumulative sleep deprivation. Untreated Sleep Disorders: Sleep Apnea, Narcolepsy, R.L.S., and other sleep disorders (untreated) prevent a person from getting enough restful sleep. This leads to excessive daytime sleepiness and increases the risk for drowsy driving accidents by up to 7 times. Medications / Alcohol: Even over the counter medications can cause drowsiness. Medications that impair a drivers attention should have a warning label. Alcohol naturally makes you sleepy and on its own can cause accidents. Combined with excessive drowsiness its effects are amplified. Signs of Drowsy Driving: * You don't remember driving the last few miles * You may drift out of your russell * You are unable to focus and your thoughts wander * You may yawn more often than normal 
 * You have difficulty keeping your eyes open / nodding off * Missing traffic signs, speeding, or tailgating Prevention-  
Good sleep hygiene, lifestyle and behavioral choices have the most impact on drowsy driving. There is no substitute for sleep and the average person requires 8 hours nightly. If you find yourself driving drowsy, stop and sleep. Consider the sleep hygiene tips provided during your visit as well. Medication Refill Policy: Refills for all medications require 1 week advance notice. Please have your pharmacy fax a refill request. We are unable to fax, or call in \"controled substance\" medications and you will need to pick these prescriptions up from our office. My[employer] Activation Thank you for requesting access to [employer]. Please follow the instructions below to securely access and download your online medical record. [employer] allows you to send messages to your doctor, view your test results, renew your prescriptions, schedule appointments, and more. How Do I Sign Up? 1. In your internet browser, go to [email or web address]. 2. Click on the First Time User? Click Here link in the Sign In box. You will see the New Member Sign Up page. 3. Enter your [employer] Access Code exactly as it appears below. You will not need to use this code after youve completed the sign-up process. If you do not sign up before the expiration date, you must request a new code. [employer] Access Code: [email or web address] Expires: 2020  8:46 AM (This is the date your [employer] access code will ) 4. Enter the last four digits of your Social Security Number (xxxx) and Date of Birth (mm/dd/yyyy) as indicated and click Submit. You will be taken to the next sign-up page. 5. Create a [employer] ID. This will be your [employer] login ID and cannot be changed, so think of one that is secure and easy to remember. 6. Create a [employer] password. You can change your password at any time. 7. Enter your Password Reset Question and Answer. This can be used at a later time if you forget your password. 8. Enter your e-mail address.  You will receive e-mail notification when new information is available in [employer]. 9. Click Sign Up. You can now view and download portions of your medical record. 10. Click the Download Summary menu link to download a portable copy of your medical information. Additional Information If you have questions, please call [phone]. Remember, [employer] is NOT to be used for urgent needs. For medical emergencies, dial 911.

## 2024-08-20 NOTE — TELEPHONE ENCOUNTER
Problem: Hyperinflation  Goal: Prevent or improve atelectasis  Description: Target End Date:  3 to 4 days    1. Instruct incentive spirometry usage  2.  Perform hyperinflation therapy as indicated  Outcome: Progressing        Respiratory Update  Treatment Modality: PEP  Frequency: QID  IS 2500    Pt tolerating current treatments well with no adverse reactions, will continue to monitor    appointment is scheduled for 12/21/17